# Patient Record
Sex: MALE | Race: WHITE | Employment: OTHER | URBAN - METROPOLITAN AREA
[De-identification: names, ages, dates, MRNs, and addresses within clinical notes are randomized per-mention and may not be internally consistent; named-entity substitution may affect disease eponyms.]

---

## 2019-05-14 NOTE — TELEPHONE ENCOUNTER
Patient having surgery on 05/23/19 , L4-5, L5-S1 anterior lumbar interbody fusion.  L2-3, L3-4 lateral fusion with posterior fixation with Dr Lillian Conklin [facility]    H&P-Complete  LABS-Glucose-107 ,A1C-5.5 ,BUN-28,BUN/CREA-25.5,  Calculated,+MSSA Osmolality-300

## 2019-05-14 NOTE — H&P
[facility] PRE-OP CLINIC DEWAYNEDANIAL    PRE-OP NOTE    HPI:   I have been consulted by Dr. Natanael Daniel to see Pamela Arroyo 66year old male for a preoperative evaluation and medical clearance.  He has a long history of worsening severe degenerative arthritis of his b Laterality Date   • ARTHROSCOPY OF JOINT UNLISTED Bilateral    • KNEE REPLACEMENT SURGERY Left 2016   • KNEE REPLACEMENT SURGERY Right 2000   • TONSILLECTOMY     • TOTAL KNEE REPLACEMENT Bilateral     right, left     Social History    Socioeconomic History Eyes:  Denies eye pain, visual loss, blurred vision, double vision. Ears, Nose, Throat:  Denies congestion, runny nose or sore throat. INTEGUMENTARY:  Denies rashes, itching, skin lesion,   CARDIOVASCULAR:  Denies DVT.  Denies chest pain, palpitations, jose r EXTREMITIES:  No edema, no cyanosis, no clubbing,   NEURO:  No focal deficit, speech fluent, normal gait, strength and tone, sensory intact      Lab Results   Component Value Date    WBC 3.9 (L) 05/14/2019    HGB 12.9 (L) 05/14/2019    HCT 39.0 05/14/201 apnea)  Comment: he can not tolerate the CPAP mask    (W23.336) History of total knee arthroplasty, bilateral    (S96.161) History of alcohol use    (Z88.8) Allergy status / anaphylaxis  Comment: codeine, eggplant, oxycodone, PCN, Sulfa    (Z68.36) BMI 36. protection: (hydration / NSAID and ACE/ARB avoidance)   Cognitive protection: (minimize narcotics, benzodiazepines, scopolamine)     Pain management and Physical therapy as per Orthopedic service.    Home Health as needed    Thank you for the opportunity to

## 2019-05-15 NOTE — TELEPHONE ENCOUNTER
Dr Solis Left please review.       H&P-Complete  LABS-Glucose-107 ,A1C-5.5 ,BUN-28,BUN/CREA-25.5,  Calculated,+MSSA(patient notified and instructed on antibiotic) Osmolality-300,WBC-3.9,HGB-12.9,PLT-149,  Lymphocyte Absolute-0.8  EKG-Abnormal  XRAY-Abnormal extra

## 2019-05-16 NOTE — TELEPHONE ENCOUNTER
Spoke with patient informed he is cleared for surgery ,per Dr Dickie Eisenmenger he should not take his lisinopril on the day of surgery.

## 2019-05-21 PROBLEM — M48.061 SPINAL STENOSIS OF LUMBAR REGION: Chronic | Status: ACTIVE | Noted: 2019-05-21

## 2019-05-21 PROBLEM — Z87.898 HISTORY OF ALCOHOL USE: Chronic | Status: ACTIVE | Noted: 2019-05-21

## 2019-05-21 PROBLEM — G47.33 OSA (OBSTRUCTIVE SLEEP APNEA): Status: ACTIVE | Noted: 2019-05-21

## 2019-05-21 PROBLEM — Z87.442 HISTORY OF KIDNEY STONES: Chronic | Status: ACTIVE | Noted: 2019-05-21

## 2019-05-21 PROBLEM — I10 ESSENTIAL HYPERTENSION: Chronic | Status: ACTIVE | Noted: 2019-05-21

## 2019-05-21 PROBLEM — J45.20 MILD INTERMITTENT ASTHMA WITHOUT COMPLICATION (HCC): Chronic | Status: ACTIVE | Noted: 2019-05-21

## 2019-05-21 PROBLEM — E78.2 MIXED HYPERLIPIDEMIA: Chronic | Status: ACTIVE | Noted: 2019-05-21

## 2019-05-23 PROBLEM — M41.9 KYPHOSCOLIOSIS: Chronic | Status: ACTIVE | Noted: 2019-05-23

## 2019-05-23 NOTE — PROGRESS NOTES
[facility] HOSP - [facility]    Ortho Medical Progress Note     Darin Schilling Patient Status:  Surgery Admit Nirmala Hahn    1940 MRN [id number]   Location [facility] UNIT Attending Blanca Timmons MD   Hosp Day # 0 PCP No prima meds reconciled    The patient was discussed with Dr. Ebony Zhu. We are following for medical assessment by monitoring respiratory status, hemodynamics, GI status, reviewing labs, and assisting with anticoagulation monitoring, patient specific.           Ezequiel Davidson

## 2019-05-23 NOTE — ANESTHESIA PREPROCEDURE EVALUATION
Anesthesia PreOp Note    HPI:     Cesario Velazco is a 66year old male who presents for preoperative consultation requested by: Jaimee Stinson MD    Date of Surgery: 5/23/2019    Procedure(s):  ANTERIOR SPINAL FUSION 2 LEVEL  FAR LATERAL LUMBAR INTERBODY F celecoxib 200 MG Oral Cap Take 200 mg by mouth daily. Disp:  Rfl:  5/16/2019   Lisinopril-hydroCHLOROthiazide 20-12.5 MG Oral Tab Take 1 tablet by mouth daily. Disp:  Rfl:  5/22/2019 at 1000   atorvastatin 40 MG Oral Tab Take 40 mg by mouth daily.  Disp:  R Financial resource strain: Not on file      Food insecurity:        Worry: Not on file        Inability: Not on file      Transportation needs:        Medical: Not on file        Non-medical: Not on file    Tobacco Use      Smoking status: Former Smoke Vital Signs: Body mass index is 36.81 kg/m². height is 1.651 m (5' 5\") and weight is 100.3 kg (221 lb 3 oz). His oral temperature is 98.3 °F (36.8 °C). His blood pressure is 137/78 and his pulse is 84.  His respiration is 20 and oxygen saturation is 93% I have informed Darin Schilling   of the risks of arterial lines including, but not limited to: failure, infection, bleeding, nerve damage, and vascular occlusion. The patient desires the line as proposed.     CHAVA ROPER  5/23/2019 6:40 AM

## 2019-05-23 NOTE — ANESTHESIA PROCEDURE NOTES
Arterial Line  Date/Time: 5/23/2019 7:45 AM  Performed by: Ally Hill CRNA  Authorized by: Ally Hill CRNA     Procedure Start: 5/23/2019 7:40 AM  Procedure End:  5/23/2019 7:45 AM  Site Identification: real time ultrasound guided, static

## 2019-05-23 NOTE — H&P
[facility] Patient Status:  Surgery Admit Gage Bradshaw    1940 MRN [id number]   Location [facility] Attending Luis Miguel Macdonald MD   Hosp Day # 0 PCP No primary care provider on file.      Principal Pr

## 2019-05-23 NOTE — ANESTHESIA PROCEDURE NOTES
Airway  Urgency: elective      General Information and Staff    Patient location during procedure: OR  Anesthesiologist: Dutch Lara MD  Resident/CRNA: Tino Kaur CRNA  Performed: CRNA     Indications and Patient Condition  Indications for airway

## 2019-05-23 NOTE — ANESTHESIA POSTPROCEDURE EVALUATION
Patient: Junaid Lynn    Procedure Summary     Date:  05/23/19 Room / Location:  [facility] OR  / [facility] OR    Anesthesia Start:  5138 Anesthesia Stop:  9263    Procedures:       ANTERIOR SPINAL FUSION 2 LEVEL (N/A )      FAR LATERAL LUMBAR INTERBODY FU

## 2019-05-23 NOTE — OPERATIVE REPORT
OPERATIVE REPORT    DATE OF SURGERY   5/23/19    PATIENT   Candie Haile    PREOPERATIVE DIAGNOSIS    Bilateral lumbar radiculopathy with kyphoscoliosis at L2-S1    POST OPERATIVE DIAGNOSIS   Bilateral lumbar radiculopathy with kyphoscoliosis at L2-S1    O spacer was placed. TECHNIQUE Ancef 2g was given preoperatively. Upon successful   intubation, the patient was transferred onto the regular   operating room table in a supine position. All pressure points   were well padded.  The abdomen was prepped an flank was prepped and draped in the usual   fashion. A surgical timeout was performed. The L2-3, and L3-4 disk level   was marked on the lateral radiograph from the patient's skin. An incision was made immediately anterior to the paraspinal   muscles. performed as the L3-4 level. Fluoroscopy confirmed that good straight position and   alignment of the spine. The fascia was then approximated with 0   Vicryl sutures, subcutaneous tissue, and skin was closed and   sterile dressings were applied.        The

## 2019-05-24 NOTE — RESPIRATORY THERAPY NOTE
BRAEDEN ASSESSMENT:    Pt does not have a previous diagnosis of BRAEDEN. Pt does not routinely use a CPAP device at home. This pt is not suspected to be at high risk for BRAEDEN and sleep lab packet was not provided to patient for outpatient follow-up. Cathi Kennedy

## 2019-05-24 NOTE — PHYSICAL THERAPY NOTE
PHYSICAL THERAPY EVALUATION - INPATIENT     Room Number: 207/207-A  Evaluation Date: 5/24/2019  Type of Evaluation: Initial   Physician Order: PT Eval and Treat    Presenting Problem: LBP s/p Ant/post spinal fusion w/instrumentation L2-S1  Reason for Ther to help w/pain control. The brace should be delivered today per RN (later this afternoon) and will intiiate training with pt/family. Patient and family educated regarding neutral spine and precautions (BLT).  Patient is able to verbalize 3/3 but needs cu Luis Bowen MD at [address] [place] [address] MAIN OR   • KNEE REPLACEMENT SURGERY Left 2016   • KNEE REPLACEMENT SURGERY Right 2000   • POSTERIOR LUMBAR INTERBODY FUSION - MIS TLIF 1 LEVEL N/A 5/23/2019    Performed by Luis Bowen MD at [place] [address] ACTIVITY TOLERANCE  Pulse: 115(with walking, 97 at rest)  Heart Rate Source: Monitor     BP: (!) 84/66(with walking; 115/75 at rest seated; recovery 101/55)  BP Location: Left arm  BP Method: Automatic  Patient Position: Sitting    O2 WALK        SPO2 training  Neuromuscular re-educate  Posture  Transfer training  pt/family education    Patient End of Session: Up in chair; With [facility] staff;Needs met;Call light within reach;RN aware of session/findings; All patient questions and concerns addressed; Family pres

## 2019-05-24 NOTE — PROGRESS NOTES
[facility] - [facility]    Ortho Medical Progress Note     Lissa Sierra Patient Status:  Inpatient    1940 MRN [id number]   Location [facility] 2W/SW Attending Malcom Servin MD   [facility] Day # 1 PCP No primary care provider on file. start alfusosin to in hopes to prevent urine retention        Results:     Lab Results   Component Value Date    WBC 5.9 05/24/2019    HGB 11.0 (L) 05/24/2019    HCT 33.1 (L) 05/24/2019    .0 (L) 05/24/2019    CREATSERUM 0.96 05/24/2019    BUN 22 (H

## 2019-05-25 PROBLEM — N17.9 ACUTE KIDNEY FAILURE (HCC): Status: ACTIVE | Noted: 2019-05-25

## 2019-05-25 PROBLEM — I95.81 POSTPROCEDURAL HYPOTENSION: Status: ACTIVE | Noted: 2019-05-25

## 2019-05-25 PROBLEM — T78.40XA ALLERGIC REACTION CAUSED BY A DRUG: Status: ACTIVE | Noted: 2019-05-25

## 2019-05-25 PROBLEM — N17.9 ACUTE KIDNEY FAILURE: Status: ACTIVE | Noted: 2019-05-25

## 2019-05-25 NOTE — CONSULTS
[facility]  Report of Consultation    Teofilo Jama Patient Status:  Inpatient    1940 MRN [id number]   Location [facility] 2W/SW Attending Fiorella Horton MD   [facility] Day # 2 PCP No primary care provider on ARTHROSCOPY OF JOINT UNLISTED Bilateral    • FAR LATERAL LUMBAR INTERBODY FUSION 2 LEVEL N/A 5/23/2019    Performed by Jcarlos Leal MD at [address]   • KNEE REPLACEMENT SURGERY Left 2016   • KNEE REPLACEMENT SURGERY Right 2000   • POSTERIOR LUMBAR INT (SENOKOT) tab 8.6 mg, 8.6 mg, Oral, Daily  •  bisacodyl (DULCOLAX) rectal suppository 10 mg, 10 mg, Rectal, Daily PRN  •  Cyclobenzaprine HCl (FLEXERIL) tab 5 mg, 5 mg, Oral, TID PRN  •  diphenhydrAMINE (BENADRYL) cap/tab 25 mg, 25 mg, Oral, Q4H PRN **OR** Clear without wheezes, rales, rhonchi or dullness. Normal excursions and effort. Abdomen: Soft, non-tender. Extremities: Without clubbing, cyanosis or edema. Peripheral pulses are 2+. Neurologic: Alert and oriented, normal affect.   Skin: Warm and dry

## 2019-05-25 NOTE — PLAN OF CARE
Problem: Patient/Family Goals  Goal: Patient/Family Long Term Goal  Description  Patient's Long Term Goal: Return home    Interventions:  - Monitor for home going needs  - Wean O2 as tolerated  - See additional Care Plan goals for specific interventions Achieves optimal ventilation and oxygenation  Description  INTERVENTIONS:  - Assess for changes in respiratory status  - Assess for changes in mentation and behavior  - Position to facilitate oxygenation and minimize respiratory effort  - Oxygen supplement tissue  - Implement wound care per orders  - Initiate isolation precautions as appropriate  - Initiate Pressure Ulcer prevention bundle as indicated  Outcome: Progressing

## 2019-05-25 NOTE — OCCUPATIONAL THERAPY NOTE
OCCUPATIONAL THERAPY EVALUATION - INPATIENT     Room Number: 207/207-A  Evaluation Date: 5/25/2019  Type of Evaluation: Initial  Presenting Problem: s/p spinal fusion L2-S1    Physician Order: IP Consult to Occupational Therapy  Reason for Therapy: ADL/IAD Recommendations: TBD    PLAN  OT Treatment Plan: Balance activities; Energy conservation/work simplification techniques;ADL training;IADL training;Functional transfer training; Endurance training;Patient/Family education;Patient/Family training;Equipment jayla Device(s): cane occasionally    Prior Level of Sanders: Pt reports being mod I for all ADL's and IADL's PTA.     SUBJECTIVE  \"I am going home and will go to outpatient therapy at [facility]\"    [employer] assistance(x2)  Sit to Stand:  Moderate assistance(x2)  Toilet Transfer: mod A  Shower Transfer: mod A  Chair Transfer: mod A    Bedroom Mobility: mod A    BALANCE ASSESSMENT  Static Sitting: SBA  Dynamic Sitting: CGA  Static Standing: min A  Dynamic Standi

## 2019-05-25 NOTE — OCCUPATIONAL THERAPY NOTE
OT orders received and chart reviewed. Pt off floor at x-ray this AM.  Will re-attempt later as schedule allows.

## 2019-05-25 NOTE — CONSULTS
[facility] - [facility]    Consult Note    Date:  5/25/2019  Date of Admission:  5/23/2019      Chief Complaint:   Pardeep You is a(n) 66year old male with hypotension.     HPI:   The patient is postoperative day #2 status post L to S1 anterior and po History: , 3 kids, quit tobacco 15 years ago after 1 pack/day, 3-4 alcoholic drinks per day, retired restaurant businessman  Social History    Tobacco Use      Smoking status: Former Smoker        Packs/day: 1.00        Years: 30.00        Pack year unremarkable except for the chronic discomforts and gait impairment associated with spinal stenosis. No weight loss no weight gain.     Physical Exam:   Vital Signs:  Blood pressure 98/51, pulse 89, temperature 99.3 °F (37.4 °C), temperature source Temporal hypertension–off medications at this juncture    6. History of alcohol consumption    7. Status post lumbar fusion–as per neurosurgery. Aggressive antipain, anti-thrombosis and anti-atelectasis measures.   Patient has tramadol and Dilaudid ordered for pa

## 2019-05-25 NOTE — PLAN OF CARE
Problem: SKIN/TISSUE INTEGRITY - ADULT  Goal: Incision(s), wounds(s) or drain site(s) healing without S/S of infection  Description  INTERVENTIONS:  - Assess and document risk factors for pressure ulcer development  - Assess and document skin integrity Progressing  5/24/2019 2012 by Boaz Aldana RN  Outcome: Not Progressing     Problem: CARDIOVASCULAR - ADULT  Goal: Maintains optimal cardiac output and hemodynamic stability  Description  INTERVENTIONS:  - Monitor vital signs, rhythm, and trends  - Monitor ordered and tolerated  - Evaluate effectiveness of GI medications  - Encourage mobilization and activity  - Obtain nutritional consult as needed  - Establish a toileting routine/schedule  - Consider collaborating with pharmacy to review patient's medicatio CARDIOVASCULAR - ADULT  Goal: Maintains optimal cardiac output and hemodynamic stability  Description  INTERVENTIONS:  - Monitor vital signs, rhythm, and trends  - Monitor for bleeding, hypotension and signs of decreased cardiac output  - Evaluate effectiv mobilization and activity  - Obtain nutritional consult as needed  - Establish a toileting routine/schedule  - Consider collaborating with pharmacy to review patient's medication profile  5/24/2019 2026 by Suzy Yi RN  Outcome: Not Progressing  5/24/201 this am. Pt unable to urinate, bladder scan x2 showed minimal urine. Urotraxel d/c per order. Night shift RN or PCT to recheck bladder scan & notify APN if still needs tx. GI:pt feels he is constipated, last BM 2 days ago.  Pt took prune juice at dinner,

## 2019-05-25 NOTE — PROGRESS NOTES
[facility]    Progress Note    Lora Gary Patient Status:  Inpatient    1940 MRN [id number]   Location [facility] 2W/SW Attending Daniel Do MD   [address] Day # 2 PCP No primary care provider on file.        Subjective: Essential hypertension - now hypotensive        4.) Mixed hyperlipidemia        5.) BRAEDEN (obstructive sleep apnea) - may need treatment at [facility]        6.) History of alcohol use - currently stable        7.) Postprocedural hypotension         IV fluid bolus a 5/23/2019 at 16:27     Approved by (CST): James Medina MD on 5/23/2019 at 16:28                        Courtney Guillaume DO  5/25/2019

## 2019-05-25 NOTE — PHYSICAL THERAPY NOTE
PHYSICAL THERAPY TREATMENT NOTE - INPATIENT     Room Number: 697/737-M       Presenting Problem: LBP s/p Ant/post spinal fusion w/instrumentation L2-S1    Problem List  Principal Problem:    Kyphoscoliosis  Active Problems:    Spinal stenosis of lumbar reg RESTRICTION  Weight Bearing Restriction: None                PAIN ASSESSMENT   Rating: 10  Location: low back  Management Techniques: Activity promotion; Body mechanics; Relaxation;Repositioning; Other (Comment)(medicated by RN)    BALANCE demonstrate transfers Sit to/from Stand at assistance level: modified independent with walker - rolling      Goal #2  Current Status     Goal #3 Patient is able to ambulate 200 feet with assist device: walker - rolling at assistance level: modified shikha

## 2019-05-25 NOTE — PROGRESS NOTES
[facility] Pharmacy Note:  Renal Dose Adjustment     Estefanialee ann Breen has been prescribed Cetirizine 10 mg orally daily, famotidine 20mg po BID, metoclopramide 10mg IV q6h PRN N/V and tramadol 50mg po q6h.     Estimated Creatinine Clearance: 31.5 mL/min (based on SCr

## 2019-05-26 NOTE — PLAN OF CARE
Problem: Patient/Family Goals  Goal: Patient/Family Long Term Goal  Description  Patient's Long Term Goal: Return home    Interventions:  - Monitor for home going needs  - Wean O2 as tolerated  - See additional Care Plan goals for specific interventions Achieves optimal ventilation and oxygenation  Description  INTERVENTIONS:  - Assess for changes in respiratory status  - Assess for changes in mentation and behavior  - Position to facilitate oxygenation and minimize respiratory effort  - Oxygen supplement tissue  - Implement wound care per orders  - Initiate isolation precautions as appropriate  - Initiate Pressure Ulcer prevention bundle as indicated  Outcome: Progressing     Patient stating that he feels better than when he had the allergic reaction last

## 2019-05-26 NOTE — PROGRESS NOTES
[facility] Pharmacy Note:  Renal Dose Adjustment      Elizabeth Canales had his medications renally adjusted yesterday for acute renal insufficiency. His renal function has improved today. Estimated Creatinine Clearance: 45 mL/min (based on SCr of 1.19mg/dL).

## 2019-05-26 NOTE — PLAN OF CARE
Double rn skin check was done. Pt. With same post op surgical incisions w/same tegaderm dressings sites unchanged, no skin issues. Will be available for questions/concerns.

## 2019-05-26 NOTE — PLAN OF CARE
Problem: PAIN - ADULT  Goal: Verbalizes/displays adequate comfort level or patient's stated pain goal  Description  INTERVENTIONS:  - Encourage pt to monitor pain and request assistance  - Assess pain using appropriate pain scale  - Administer analgesics any respiratory difficulty  - Respiratory Therapy support as indicated  - Manage/alleviate anxiety  - Monitor for signs/symptoms of CO2 retention  Outcome: Progressing     Problem: SKIN/TISSUE INTEGRITY - ADULT  Goal: Incision(s), wounds(s) or drain site(s

## 2019-05-26 NOTE — PLAN OF CARE
Problem: PAIN - ADULT  Goal: Verbalizes/displays adequate comfort level or patient's stated pain goal  Description  INTERVENTIONS:  - Encourage pt to monitor pain and request assistance  - Assess pain using appropriate pain scale  - Administer analgesics Assess the need for suctioning and perform as needed  - Assess and instruct to report SOB or any respiratory difficulty  - Respiratory Therapy support as indicated  - Manage/alleviate anxiety  - Monitor for signs/symptoms of CO2 retention  Outcome: Lydia up- wife at bedside-lam maintained, remote tele in place-no calls this shift

## 2019-05-26 NOTE — PROGRESS NOTES
[facility]    Progress Note    Elizabeth Canales Patient Status:  Inpatient    1940 MRN [id number]   Location [facility] 2W/SW Attending Veto Natarajan MD   [address] Day # 3 PCP No primary care provider on file.        Subjective: .0 (L) 05/26/2019    CREATSERUM 1.19 05/26/2019    BUN 30 (H) 05/26/2019     05/26/2019    K 4.7 05/26/2019     05/26/2019    CO2 26.0 05/26/2019     (H) 05/26/2019    CA 7.6 (L) 05/26/2019    ALB 3.7 05/14/2019    ALKPHO 59 05

## 2019-05-26 NOTE — PLAN OF CARE
Seen by md's & updated, pt/ot will see, report to [address] [place] [address].  Assessment unchanged w/o co or distress, skin unchanged, transferred to room 409 via bed w/ o2 3l/nc, all belongings including computer pad, cell phone, eyeglasses, duffle bag, etc. Pt. Will

## 2019-05-26 NOTE — PHYSICAL THERAPY NOTE
PHYSICAL THERAPY HIP TREATMENT NOTE - INPATIENT    Room Number: 976/370-M            Presenting Problem: LBP s/p Ant/post spinal fusion w/instrumentation L2-S1    Problem List  Principal Problem:    Kyphoscoliosis  Active Problems:    Spinal stenosis of trisha Inpatient Basic Mobility Short Form. Research supports that patients with this level of impairment may benefit from NINA. At this point, continue to anticipate d/c home with HHPT and 24.7, pending progress.     DISCHARGE RECOMMENDATIONS  PT Discharge Recomm amb with dec step length and dec americo(2 LO2)          Patient End of Session: Up in chair;Needs met;Call light within reach;RN aware of session/findings; All patient questions and concerns addressed; Family present    CURRENT GOALS   To be met by 5/30/19

## 2019-05-26 NOTE — PROGRESS NOTES
[facility]  Progress Note    Mavis Orf Patient Status:  Inpatient    1940 MRN [id number]   Location [facility] 2W/SW Attending Jose Ramon Cruz MD   [facility] Day # 3 PCP No primary care provider on file. Assessment:  1.   Pod #3 L-S Results   Component Value Date    TROP <0.045 05/25/2019        Medications:    • Alfuzosin HCl ER  10 mg Oral Daily with breakfast   • cetirizine  5 mg Oral Daily   • famoTIDine  20 mg Oral Daily   • traMADol HCl  50 mg Oral Q12H   • aspirin  324 mg Oral

## 2019-05-26 NOTE — PROGRESS NOTES
[facility] - [facility]    Progress Note      Assessment and Plan:   1. Hypotension–this was associated with a clear allergic reaction likely to the Norco with rash. May have had early anaphylaxis. Patient received a dose of Benadryl.   Also 60 mg dos 05/26/2019     05/26/2019    K 4.7 05/26/2019     05/26/2019    CO2 26.0 05/26/2019     05/26/2019    CA 7.6 05/26/2019       Hoang Davenport MD  Medical Director, Critical Care, [facility]  Medical Director, [address] [place] [address]

## 2019-05-27 NOTE — PLAN OF CARE
Problem: Patient/Family Goals  Goal: Patient/Family Long Term Goal  Description  Patient's Long Term Goal: Return home    Interventions:  - Monitor for home going needs  - Wean O2 as tolerated  - See additional Care Plan goals for specific interventions Achieves optimal ventilation and oxygenation  Description  INTERVENTIONS:  - Assess for changes in respiratory status  - Assess for changes in mentation and behavior  - Position to facilitate oxygenation and minimize respiratory effort  - Oxygen supplement tissue  - Implement wound care per orders  - Initiate isolation precautions as appropriate  - Initiate Pressure Ulcer prevention bundle as indicated  Outcome: Progressing     Problem: Patient Centered Care  Goal: Patient preferences are identified and inte

## 2019-05-27 NOTE — PHYSICAL THERAPY NOTE
PHYSICAL THERAPY TREATMENT NOTE - INPATIENT     Room Number: 189/326-R       Presenting Problem: LBP s/p Ant/post spinal fusion w/instrumentation L2-S1    Problem List  Principal Problem:    Kyphoscoliosis  Active Problems:    Spinal stenosis of lumbar reg O2 WALK        SPO2 Ambulation on Oxygen: 94  Ambulation oxygen flow (liters per minute): 3      AM-PAC '6-Clicks' INPATIENT SHORT FORM - BASIC MOBILITY  How much difficulty does the patient currently have. ..  -   Turning over in bed (inclu assist device: walker - rolling at assistance level: modified independent   Goal #3   Current Status  20 feet with CGA   Goal #4 Patient will negotiate 2 stairs/one curb w/ assistive device and supervision   Goal #4   Current Status  NT   Goal #5 Patient t

## 2019-05-27 NOTE — PROGRESS NOTES
[facility]    Progress Note    Darin Schilling Patient Status:  Inpatient    1940 MRN [id number]   Location [facility] 4W/SW/SE Attending Blanca Timmons MD   Hosp Day # 4 PCP No primary care provider on file.        Subjective: (L) 05/26/2019    .0 (L) 05/26/2019    CREATSERUM 1.19 05/26/2019    BUN 30 (H) 05/26/2019     05/26/2019    K 4.7 05/26/2019     05/26/2019    CO2 26.0 05/26/2019     (H) 05/26/2019    CA 7.6 (L) 05/26/2019    ALB 3.7 05/14/2019

## 2019-05-27 NOTE — PROGRESS NOTES
Contacted Dr. Sundar Decker service regarding the 13 beats of V-Tach at 9:54am as relayed to me by nursing and telemetry. The telemetry showed 13 beats ranging from 100 - 187 RVRTachycardia during exertion and asymptomatic response.  Patient is doing well now witho

## 2019-05-27 NOTE — PROGRESS NOTES
[place] [facility] - [facility]    Progress Note    Vijay Amezcua Patient Status:  Inpatient    1940 MRN [id number]   Location [facility] 4W/SW/SE Attending Silvia Schofiled MD   Hosp Day # 4 PCP No primary care provider on file.         Subjecti therapy nightly   D/w pt / declined     5.  Possible copd / prior h/o heavy smoking   Cough / today with mild green sputum but no fever or leukocytosis   F/u CXR and cbc in am   Neb as needed   Aspiration precautions      5.  DVT prophylaxis– SCDs temporari 05/14/2019 11:35:42 Electronically signed on 05/26/2019 at 12:34 by Maurice Coop M.D. Donnie Blend. Elex Pallas, MD  5/27/2019

## 2019-05-28 NOTE — PHYSICAL THERAPY NOTE
PHYSICAL THERAPY TREATMENT NOTE - INPATIENT     Room Number: 736/606-V       Presenting Problem: LBP s/p Ant/post spinal fusion w/instrumentation L2-S1    Problem List  Principal Problem:    Kyphoscoliosis  Active Problems:    Spinal stenosis of lumbar reg Discharge Recommendations: Sub-acute rehabilitation;24 hour care/supervision;Home with home health PT(depends on progress)     PLAN  PT Treatment Plan: Balance training;Transfer training;Gait training;Stair training    SUBJECTIVE  I want to wak    OBJECTIV EXERCISES  Lower Extremity Alternating marching  Ankle pumps  Glut sets  Heel slides  Knee extension  Quad sets     Position Sitting and Supine       Patient End of Session: Up in chair; All patient questions and concerns addressed;RN aware of session/findi

## 2019-05-28 NOTE — PROGRESS NOTES
SPINE ATTENDING NOTE     No acute distress  Alert and oriented  No overnight issues  Preoperative pain resolved      05/28/19  0549   BP: 143/82   Pulse: 87   Resp: 22   Temp:       Alert and oriented  Dressings intact, no drainage  Motor and sensory exam

## 2019-05-28 NOTE — PROGRESS NOTES
[place] [facility] - [facility]    Ortho Medical Progress Note     Mavis Orf Patient Status:  Inpatient    1940 MRN [id number]   Location [facility] 4W/SW/SE Attending Jose Ramon Cruz MD   Hosp Day # 5 PCP No primary care provider on file. dulcolax and fleets enema but the patient wants to wait until tomorrow      Results:     Lab Results   Component Value Date    WBC 5.4 05/28/2019    HGB 10.7 (L) 05/28/2019    HCT 32.6 (L) 05/28/2019    .0 05/28/2019    CREATSERUM 0.89 05/28/2019

## 2019-05-28 NOTE — PROGRESS NOTES
I was asked to evaluate the wound by the nursing staff.     /82 (BP Location: Right arm)   Pulse 87   Temp 97.9 °F (36.6 °C) (Oral)   Resp 22   Ht 5' 5\" (1.651 m)   Wt 241 lb 14.4 oz (109.7 kg)   SpO2 92%   BMI 40.25 kg/m²     Abd:  Soft, NTND, incis

## 2019-05-28 NOTE — PLAN OF CARE
Problem: Patient/Family Goals  Goal: Patient/Family Long Term Goal  Description  Patient's Long Term Goal: Return home    Interventions:  - Monitor for home going needs  - Wean O2 as tolerated  - See additional Care Plan goals for specific interventions edema, trend weights  Outcome: Progressing     Problem: RESPIRATORY - ADULT  Goal: Achieves optimal ventilation and oxygenation  Description  INTERVENTIONS:  - Assess for changes in respiratory status  - Assess for changes in mentation and behavior  - Posi strategies to promote bladder emptying  Outcome: Progressing     Problem: SKIN/TISSUE INTEGRITY - ADULT  Goal: Incision(s), wounds(s) or drain site(s) healing without S/S of infection  Description  INTERVENTIONS:  - Assess and document risk factors for pre

## 2019-05-28 NOTE — PLAN OF CARE
Problem: Patient/Family Goals  Goal: Patient/Family Long Term Goal  Description  Patient's Long Term Goal: Return home    Interventions:  - Monitor for home going needs  - Wean O2 as tolerated  - See additional Care Plan goals for specific interventions sites for bleeding and/or hematoma  - Assess quality of pulses, skin color and temperature  - Assess for signs of decreased coronary artery perfusion - ex.  Angina  - Evaluate fluid balance, assess for edema, trend weights  5/28/2019 0809 by Robb Peters, bladder scan as needed  - Follow urinary retention protocol/standard of care  - Consider collaborating with pharmacy to review patient's medication profile  - Implement strategies to promote bladder emptying  5/28/2019 0809 by Robb Peters RN  Outcome: P

## 2019-05-28 NOTE — OPERATIVE REPORT
Operative Report    Patient Name:  Darin Schilling  MR:  [id number]  :  1940  DOS:  19    Preop Dx:  Bilateral lumbar radiculopathy with kyphoscoliosis at L2-S1  Postop Dx:  Bilateral lumbar radiculopathy with kyphoscoliosis at L2-S1  Procedure implant placement at L4-L5, the operative field was irrigated with normal saline. The retractors were slowly removed and the operative field remained hemostatic.   There were no injury to the left ureter, peritoneum or the sympathetic chain during our diss

## 2019-05-28 NOTE — PROGRESS NOTES
[place] [facility] - [facility]    Progress Note    Mavis Orf Patient Status:  Inpatient    1940 MRN [id number]   Location [facility] 4W/SW/SE Attending Jose Ramon Cruz MD   Hosp Day # 5 PCP No primary care provider on file.         Subjecti h/o heavy smoking   Neb   antibiotics        6.  DVT prophylaxis– SCDs temporarily until okay with neurosurgery     7.  History of alcohol consumption                                Results:     Lab Results   Component Value Date    WBC 5.4 05/28/2019    H

## 2019-05-28 NOTE — OCCUPATIONAL THERAPY NOTE
OCCUPATIONAL THERAPY TREATMENT NOTE - INPATIENT    Room Number: 340/005-F  Presenting Problem: s/p spinal fusion L2-S1     Problem List  Principal Problem:    Kyphoscoliosis  Active Problems:    Spinal stenosis of lumbar region    Mild intermittent asthma PT/OT;Sub-acute rehabilitation  OT Device Recommendations: Long-handled shoehorn;Long-handled sponge;Reacher;Sock aid; Shower chair    PLAN  OT Treatment Plan: Patient/Family education;Patient/Family training;Functional transfer training;ADL training    SUB session/findings; All patient questions and concerns addressed    OT Goals:     Patient will complete functional transfer with CGA and AD  Comment: Goal met    Patient will complete toileting with CGA and AD  Comment: na    Patient will tolerate standing fo

## 2019-05-28 NOTE — CM/SW NOTE
SW met with the patient at bedside. The patient lives with his wife in 1 level home with 2 steps to enter. The patient responds being independent prior to admission with all ADL's, ambulation and driving. Patient has been using a cane PRN.     Patient would

## 2019-05-29 NOTE — PLAN OF CARE
Problem: PAIN - ADULT  Goal: Verbalizes/displays adequate comfort level or patient's stated pain goal  Description  INTERVENTIONS:  - Encourage pt to monitor pain and request assistance  - Assess pain using appropriate pain scale  - Administer analgesics any respiratory difficulty  - Respiratory Therapy support as indicated  - Manage/alleviate anxiety  - Monitor for signs/symptoms of CO2 retention  Outcome: Progressing     Problem: GASTROINTESTINAL - ADULT  Goal: Maintains or returns to baseline bowel func

## 2019-05-29 NOTE — PROGRESS NOTES
[facility]    Ortho Medical Progress Note     Deadra Mow Patient Status:  Inpatient    1940 MRN [id number]   Location [facility] 4W/SW/SE Attending Chikis Bruno MD   Hosp Day # 6 PCP No primary care provider on file. Results:     Lab Results   Component Value Date    WBC 5.3 05/29/2019    HGB 9.6 (L) 05/29/2019    HCT 29.2 (L) 05/29/2019    .0 (L) 05/29/2019    CREATSERUM 0.92 05/29/2019    BUN 24 (H) 05/29/2019     05/29/2019    K 4.3 05/29/2019

## 2019-05-29 NOTE — CM/SW NOTE
SW met with the patient and his wife, per family's request. SW confirmed the DC plan, they do want to go to rehab to [facility]. Facility accepted him. LUCIANA will continue to follow up.       Courtney Flowers, [address] [facility]

## 2019-05-29 NOTE — OCCUPATIONAL THERAPY NOTE
OCCUPATIONAL THERAPY TREATMENT NOTE - INPATIENT    Room Number: 588/178-D         Presenting Problem: s/p spinal fusion L2-S1     Problem List  Principal Problem:    Kyphoscoliosis  Active Problems:    Spinal stenosis of lumbar region    Mild intermittent to be instructed in back precautions, donning brace, and cues for safety with transfers. Spouse stated that she was not prepared for situation to have pt be discharged home, once she observed. Ayush Lux     DISCHARGE RECOMMENDATIONS  OT Discharge Recommendations: 2 ASSESSMENT  Static Sitting: good  Dynamic Sitting: good  Static Standing: fair+  Dynamic Standing: fair    FUNCTIONAL ADL ASSESSMENT  Grooming: CGA with set up   Bathing: NT  Toileting: mod assist  Upper Body Dressing: min assist  Lower Body Dressing: mod

## 2019-05-29 NOTE — PROGRESS NOTES
[facility] Note: Antimicrobial Weight/Renal Dose Adjustment for: aztreonam (AZACTAM)    Lillie Arevalo is a 66year old male who has been prescribed aztreonam (AZACTAM) 1000 mg every 23 hours.   CrCl is estimated creatinine clearance is 57.6 mL/min (based

## 2019-05-29 NOTE — PROGRESS NOTES
[facility]    Progress Note    Lora Gary Patient Status:  Inpatient    1940 MRN [id number]   Location [facility] 4W/SW/SE Attending Daniel Do MD   Hosp Day # 6 PCP No primary care provider on file.         Subjecti 20 mg x 1     3.  s/p Hypotension : resolved   Likely allergic reaction likely to the Standard [place] with rash.   S/p  dose of Benadryl.  Also 60 mg dose of Solu-Medrol      Avoid Norco  Observe      4.  History of BRAEDEN– will consider Pap therapy nightly   D/w pt /

## 2019-05-29 NOTE — PROGRESS NOTES
[facility]    Progress Note    Junaid Lynn Patient Status:  Inpatient    1940 MRN [id number]   Location [facility] 4W/SW/SE Attending Jessica Garber MD   Hosp Day # 6 PCP No primary care provider on file.          Enrique Intravenous Q24H   • ipratropium-albuterol  3 mL Nebulization TID   • traMADol HCl  100 mg Oral Q6H   • cetirizine  10 mg Oral Daily   • Alfuzosin HCl ER  10 mg Oral Daily with breakfast   • famoTIDine  20 mg Oral Daily   • aspirin  324 mg Oral Daily   • P

## 2019-05-29 NOTE — PHYSICAL THERAPY NOTE
PHYSICAL THERAPY TREATMENT NOTE - INPATIENT     Room Number: 181/174-X       Presenting Problem: LBP s/p Ant/post spinal fusion w/instrumentation L2-S1    Problem List  Principal Problem:    Kyphoscoliosis  Active Problems:    Spinal stenosis of lumbar reg today    OBJECTIVE  Precautions: Spine;Lumbar brace;TLSO    WEIGHT BEARING RESTRICTION  Weight Bearing Restriction: None                PAIN ASSESSMENT   Ratin  Location: back and abd  Management Techniques: Activity promotion;Repositioning; Body mechan chair;Family present; All patient questions and concerns addressed;RN aware of session/findings;Call light within reach; Needs met    CURRENT GOALS   Patient Goal Patient's self-stated goal is: to have less pain and to go home   Goal #1 Patient is able to de

## 2019-05-29 NOTE — PROGRESS NOTES
[facility] Dosing Service    Initial Pharmacokinetic Consult for Vancomycin Dosing     Lydia Su is a 66year old male who is being treated for pneumonia.   Pharmacy has been asked to dose Vancomycin by Dr. Nimisha Guerrero    He is allergic to codeine; eggplan

## 2019-05-30 NOTE — PLAN OF CARE
Problem: Patient/Family Goals  Goal: Patient/Family Long Term Goal  Description  Patient's Long Term Goal: Return home    Interventions:  - Monitor for home going needs  - Wean O2 as tolerated  - See additional Care Plan goals for specific interventions oxygenation  Description  INTERVENTIONS:  - Assess for changes in respiratory status  - Assess for changes in mentation and behavior  - Position to facilitate oxygenation and minimize respiratory effort  - Oxygen supplementation based on oxygen saturation orders  - Initiate isolation precautions as appropriate  - Initiate Pressure Ulcer prevention bundle as indicated  Outcome: Progressing     Problem: Patient Centered Care  Goal: Patient preferences are identified and integrated in the patient's plan of car

## 2019-05-30 NOTE — PROGRESS NOTES
[facility]    Progress Note      Assessment and Plan:   1. Hypotension–this was associated with a clear allergic reaction likely to the Norco with rash. May have had early anaphylaxis. Patient received a dose of Benadryl.   Also 60 mg dos with symmetric tone and strength and reflex.   Skin without gross abnormality     Results:     Lab Results   Component Value Date    WBC 5.0 05/30/2019    HGB 9.6 05/30/2019    HCT 28.8 05/30/2019    .0 05/30/2019    CREATSERUM 0.92 05/30/2019    BUN

## 2019-05-30 NOTE — OCCUPATIONAL THERAPY NOTE
OCCUPATIONAL THERAPY TREATMENT NOTE - INPATIENT    Room Number: 434/801-C         Presenting Problem: s/p spinal fusion L2-S1     Problem List  Principal Problem:    Kyphoscoliosis  Active Problems:    Spinal stenosis of lumbar region    Mild intermittent training    SUBJECTIVE  Pt seen bedside and agreeable for OT session    OBJECTIVE  Precautions: Lumbar brace;Spine    WEIGHT BEARING RESTRICTION  Weight Bearing Restriction: None                PAIN ASSESSMENT  Ratin  Location: (lower back)  Management transfer with CGA and AD  Comment: min assist x2 2/ pt appearing 'shaky', nursing aware.  Pt appearing anxious    Patient will complete toileting with CGA and AD  Comment: mod assist     Patient will tolerate standing for 5 minutes in prep for adls with SBA

## 2019-05-30 NOTE — PROGRESS NOTES
[facility]    Progress Note    Mauro Maldonado Patient Status:  Inpatient    1940 MRN [id number]   Location [facility] 4W/SW/SE Attending Katharina Helms MD   Hosp Day # 7 PCP No primary care provider on file.          Assessmen ipratropium-albuterol  3 mL Nebulization TID   • traMADol HCl  100 mg Oral Q6H   • cetirizine  10 mg Oral Daily   • Alfuzosin HCl ER  10 mg Oral Daily with breakfast   • famoTIDine  20 mg Oral Daily   • aspirin  324 mg Oral Daily   • PEG 3350  17 g Oral BI

## 2019-05-30 NOTE — PROGRESS NOTES
[facility]    Ortho Medical Progress Note     Lissa Sierra Patient Status:  Inpatient    1940 MRN [id number]   Location [facility] 4W/SW/SE Attending Malcom Servin MD   Hosp Day # 7 PCP No primary care provider on file. Incisional wound treated with wet to dry soaks    Results:     Lab Results   Component Value Date    WBC 5.0 05/30/2019    HGB 9.6 (L) 05/30/2019    HCT 28.8 (L) 05/30/2019    .0 05/30/2019    CREATSERUM 0.92 05/30/2019    BUN 25 (H) 05/30/2019    N

## 2019-05-31 NOTE — PROGRESS NOTES
HPI: Augustine Loya  is a 67 yo male who was admitted to [address] [place] [address] for a L2-S1 anterior/lateral/posterior surgery on 5/23/19 by dr. Juan Carlos Gu. Pulmonology consulted for LLL pneumonia. He was treated with antibiotics.  He had hypotension and rash, likely an allergic re tolerated in the             past.  Required IV diphenhydramine and             methylprednisolone after allergic reaction.   Oxycodone               HIVES  Peanuts                 Tightness in Throat  Penicillins             ANAPHYLAXIS  Sulfa Antibiotics incision cover with gauze and tegaderm  L flank incision foam dressing Q3 days and PRN      2.  Pneumonia  LLL  Continue levaquin 500mg po qd x 2more days in rehab  pulm consult  Nebs prn       3.  s/p Hypotension . Resolved   Likely allergic reaction likel

## 2019-05-31 NOTE — PLAN OF CARE
Problem: PAIN - ADULT  Goal: Verbalizes/displays adequate comfort level or patient's stated pain goal  Description  INTERVENTIONS:  - Encourage pt to monitor pain and request assistance  - Assess pain using appropriate pain scale  - Administer analgesics any respiratory difficulty  - Respiratory Therapy support as indicated  - Manage/alleviate anxiety  - Monitor for signs/symptoms of CO2 retention  Outcome: Progressing     Problem: GASTROINTESTINAL - ADULT  Goal: Maintains or returns to baseline bowel func the planning and delivery of care  - Encourage patient/family to participate in care and decision-making at the level they choose  - Honor patient and family perspectives and choices  Outcome: Progressing   Pt is ao x 4, pt is SBA with walker, this nurse m

## 2019-05-31 NOTE — PROGRESS NOTES
[place] [facility] - [facility]    Progress Note    Bethany Pretty Patient Status:  Inpatient    1940 MRN [id number]   Location [facility] 4W/SW/SE Attending Sixto East MD   Hosp Day # 8 PCP No primary care provider on file.         Subjecti  05/30/2019    K 4.0 05/30/2019     05/30/2019    CO2 27.0 05/30/2019     (H) 05/30/2019    CA 8.4 (L) 05/30/2019    ALB 3.7 05/14/2019    ALKPHO 59 05/14/2019    BILT 0.6 05/14/2019    TP 6.7 05/14/2019    AST 28 05/14/2019    ALT 40

## 2019-05-31 NOTE — PLAN OF CARE
Problem: Patient/Family Goals  Goal: Patient/Family Long Term Goal  Description  Patient's Long Term Goal: Return home    Interventions:  - Monitor for home going needs  - Wean O2 as tolerated  - See additional Care Plan goals for specific interventions ADULT  Goal: Achieves optimal ventilation and oxygenation  Description  INTERVENTIONS:  - Assess for changes in respiratory status  - Assess for changes in mentation and behavior  - Position to facilitate oxygenation and minimize respiratory effort  - Oxyg dressing/incision, wound bed, drain sites and surrounding tissue  - Implement wound care per orders  - Initiate isolation precautions as appropriate  - Initiate Pressure Ulcer prevention bundle as indicated  Outcome: Adequate for Discharge     Problem: Vinay Brock

## 2019-05-31 NOTE — OCCUPATIONAL THERAPY NOTE
Pt not seen at this time secondary to c/o not feeling well, nursing is aware. Will follow up with pt later in date as schedule permits.

## 2019-05-31 NOTE — PHYSICAL THERAPY NOTE
Attempted to try to see patient 2nd time, he still in bed not feeling good. Will try later to see him.

## 2019-05-31 NOTE — PHYSICAL THERAPY NOTE
Patient very anxious in bed with RN present, wants to rest at present time. Will tr to see him later.

## 2019-05-31 NOTE — CM/SW NOTE
Patient is scheduled to transfer to [facility] today, 5.31.19 at 4 pm via Barney Zamudio , quote $33. PCS form in the chart. Report [phone].       Suyapa Koroma, [facility]

## 2019-05-31 NOTE — PROGRESS NOTES
[facility]    Progress Note    Lin Seay Patient Status:  Inpatient    1940 MRN [id number]   Location [facility] 4W/SW/SE Attending Ania Valadez MD   Hosp Day # 8 PCP No primary care provider on file.        Subjective: ALKPHO 59 05/14/2019    BILT 0.6 05/14/2019    TP 6.7 05/14/2019    AST 28 05/14/2019    ALT 40 05/14/2019    PTT 29.9 05/14/2019    INR 1.08 05/14/2019    TSH 1.580 05/29/2019    TROP <0.045 05/25/2019       Xr Chest Pa + Lat Chest (cpt=71046)    Result D

## 2019-05-31 NOTE — PHYSICAL THERAPY NOTE
PHYSICAL THERAPY TREATMENT NOTE - INPATIENT     Room Number: 540/703-E       Presenting Problem: LBP s/p Ant/post spinal fusion w/instrumentation L2-S1    Problem List  Principal Problem:    Kyphoscoliosis  Active Problems:    Spinal stenosis of lumbar reg Static Sitting: Good  Dynamic Sitting: Fair           Static Standing: Fair  Dynamic Standing: Fair -    ACTIVITY TOLERANCE                         O2 WALK                  AM-PA #2  Current Status  CGA to min  A with RW   Goal #3 Patient is able to ambulate 200 feet with assist device: walker - rolling at assistance level: modified independent   Goal #3   Current Status 150 feet with CGA with RW   Goal #4 Patient will negotiate 2

## 2019-06-04 NOTE — DISCHARGE SUMMARY
[facility]  Discharge Summary    Tessie Bamberger Patient Status:  Inpatient    1940 MRN [id number]   Location [facility] 4W/SW/SE Attending No att. providers found   Hosp Day # 8 PCP No primary care provider on file.      Date of Admissio rub   or gallop   Abdomen:     Soft, non-tender, bowel sounds active all four quadrants,     no masses, no organomegaly   Genitalia:    Normal male without lesion, discharge or tenderness   Rectal:    Normal tone, normal prostate, no masses or tenderness; hydroxide 400 MG/5ML Oral Suspension  Take 30 mL by mouth daily as needed for constipation. , Historical, R-0    PEG 3350 Oral Powd Pack  Take 17 g by mouth 2 (two) times daily. , Historical, R-0    Senna 8.6 MG Oral Tab  Take 1 tablet (8.6 mg total) by mout

## 2019-06-04 NOTE — PROGRESS NOTES
HPI: Carmela Marques  is a 65 yo male who was admitted to [address] [place] [address] for a L2-S1 anterior/lateral/posterior surgery on 5/23/19 by dr. Mily Damon. Pulmonology consulted for LLL pneumonia. He was treated with antibiotics.  He had hypotension and rash, likely an allergic re Reviewed                 SUBJECTIVE/REVIEW OF SYSTEMS: Seen in room. Pain 7/10 currently. Was on scheduled tramadol, now switched to PRN. Encouraged to take in time. Had a BM today. No sob. No cp/palpitations.  No hematuria/dysuria/frequency.       PHYSICAL inpatient  Avoid Norco     4.  History of BRAEDEN  Declined pap per records     5. Possible copd / prior h/o heavy smoking   Cont nebs prn     6. HTN /controlled  Cont diltiazem, lisinopril hctz     7.  Hx etoh abuse  Cessation counseling

## 2019-06-07 NOTE — PROGRESS NOTES
HPI: Bob Knox  is a 65 yo male who was admitted to [address] [place] [address] for a L2-S1 anterior/lateral/posterior surgery on 5/23/19 by dr. Michelle Lockwood. Pulmonology consulted for LLL pneumonia. He was treated with antibiotics.  He had hypotension and rash, likely an allergic re LABS/Imaging: Reviewed     SUBJECTIVE/REVIEW OF SYSTEMS: Seen in room, wife at bedside. Reports pain well controlled. No sob. No cp/palpitations. No hematuria/dysuria/frequency.   Reports normal BMs.     PHYSICAL EXAM:  GENERAL HEALTH: NAD  HEENT: atrau h/o heavy smoking   Cont nebs prn     6. HTN /controlled  Cont diltiazem, lisinopril hctz     7.  Hx etoh abuse  Cessation counseling

## 2019-06-11 NOTE — PROGRESS NOTES
Patient presents with:  Post-Op: Pt here for post op s/p L4-5, L5-S1 anterior lumbar interbody fusion. L2-3, L3-4 lateral fusion with posterior fixation on 5/23/19.   Pt states he had pneumonia and was coughing then his incision broke open 3 days after surg

## 2019-11-23 PROBLEM — K92.2 GASTROINTESTINAL HEMORRHAGE: Status: ACTIVE | Noted: 2019-11-23

## 2019-11-23 NOTE — ANESTHESIA PREPROCEDURE EVALUATION
Anesthesia PreOp Note    HPI:     Dorita Dumont is a 78year old male who presents for preoperative consultation requested by: Yamila Moyer MD    Date of Surgery: 11/23/2019    Procedure(s):  ESOPHAGOGASTRODUODENOSCOPY (EGD)  Indication: GI Bleed MAIN OR   • TONSILLECTOMY     • TOTAL KNEE REPLACEMENT Bilateral     right, left       aspirin 81 MG Oral Chew Tab, Chew 4 tablets (324 mg total) by mouth daily. , Disp: , Rfl: 0, 11/23/2019 at 1000  Lisinopril-hydroCHLOROthiazide 20-12.5 MG Oral Tab, Take 1724  propofol (DIPRIVAN) infusion, , Intravenous, Continuous PRN, Alma Pagan MD, Stopped at 11/23/19 1723  succinylcholine chloride (ANECTINE) injection, , Intravenous, PRN, Alma Pagan MD, 100 mg at 11/23/19 1724  lactated ringers infu HYPOTENSION    Comment:Possible early anaphylaxis. Had tolerated in the             past.  Required IV diphenhydramine and             methylprednisolone after allergic reaction.   Oxycodone               HIVES  Peanuts                 Tightness in Throat on file        Forced sexual activity: Not on file    Other Topics      Concerns:        Not on file    Social History Narrative      Not on file      Available pre-op labs reviewed.   Lab Results   Component Value Date    WBC 4.4 11/23/2019    RBC 2.88 (L) benefits, risks including possible dental damage if relevant, major complications, and any alternative forms of anesthetic management. All of the patient's questions were answered to the best of my ability.  The patient desires the anesthetic management a

## 2019-11-23 NOTE — ED PROVIDER NOTES
Patient Seen in: [facility] AND [facility] Emergency Department    History   Patient presents with:  GI Bleeding (gastrointestinal)    Stated Complaint: Rectal bleeding    HPI    Patient is here with complaint of bleeding from the rectum.   He has had this dark s 1999        Years since quittin.5      Smokeless tobacco: Never Used    Alcohol use:  Yes      Alcohol/week: 14.0 standard drinks      Types: 14 Shots of liquor per week    Drug use: Not Currently        Medications :   aspirin 81 MG Oral Chew Tab Triage Vitals   BP 11/23/19 1421 (!) 89/40   Pulse 11/23/19 1421 (!) 43   Resp 11/23/19 1421 22   Temp 11/23/19 1421 97.8 °F (36.6 °C)   Temp src 11/23/19 1421 Oral   SpO2 11/23/19 1421 94 %   O2 Device 11/23/19 1612 None (Room air)       Current:/68 METABOLIC PANEL (8) - Abnormal; Notable for the following components:       Result Value    Glucose 149 (*)     BUN 60 (*)     BUN/CREA Ratio 49.2 (*)     Calculated Osmolality 312 (*)     GFR, Non- 56 (*)     All other components within no diagnosis)    Disposition:  There is no disposition on file for this visit. Follow-up:  No follow-up provider specified.     Medications Prescribed:  Current Discharge Medication List

## 2019-11-23 NOTE — ANESTHESIA PROCEDURE NOTES
Airway  Date/Time: 11/23/2019 5:25 PM  Urgency: Elective    Airway not difficult    General Information and Staff    Patient location during procedure: OR  Anesthesiologist: Nichole Casiano MD  Performed: anesthesiologist     Indications and Patient C

## 2019-11-23 NOTE — ANESTHESIA POSTPROCEDURE EVALUATION
Patient: Marlys Valdez    Procedure Summary     Date:  11/23/19 Room / Location:  [address] [place] [address] ENDOSCOPY 01 / [address] [place] [address] ENDOSCOPY    Anesthesia Start:  5325 Anesthesia Stop:  1419    Procedure:  ESOPHAGOGASTRODUODENOSCOPY (EGD) (N/A ) Diagnosis:  (Duodenal ulcers, Terryl Fanning

## 2019-11-23 NOTE — CONSULTS
Gastroenterology consultation note    Reason for consultation:  Black stools, anemia, remote history of ulcer disease      History of present illness:   The patient is a 78year old male who has a remote history of a bleeding ulcer some 25 years prior prese HIVES  Hydrocodone-Acetami*    HYPOTENSION    Comment:Possible early anaphylaxis. Had tolerated in the             past.  Required IV diphenhydramine and             methylprednisolone after allergic reaction.   Oxycodone               HIVES  Peanuts of current or ex partner: Not on file        Emotionally abused: Not on file        Physically abused: Not on file        Forced sexual activity: Not on file    Other Topics      Concerns:        Not on file    Social History Narrative      Not on file 35.1 - 46.3 fL 45.8 42.7   RDW      11.0 - 15.0 % 13.2 11.9   Platelet Count      078.1 - 450.0 10(3)uL 205.0 248.0   Prelim Neutrophil Abs      1.50 - 7.70 x10 (3) uL 2.78 3.76   Neutrophils Absolute      1.50 - 7.70 x10(3) uL 2.78 3.76   Lymphocytes Abs that we proceed with an upper endoscopy. We discussed the rationale, nature and risks. We discussed that bleeding typically ceases spontaneously or can be stopped endoscopically. If not angiographic or surgical intervention could be necessary.       Marcelino

## 2019-11-23 NOTE — OPERATIVE REPORT
[facility] Endoscopy Report      Date of Procedure:  11/23/19        Preoperative Diagnosis:  Gastrointestinal bleeding (melena)      Postoperative Diagnosis:  1. Multiple clean-based duodenal ulcerations  2.   Nonbleeding duodenal vascular one 3 mm superficial ulceration of the gastric antrum without stigmata of bleeding. Biopsies for surgical pathology were obtained. There were at least 4 ulcerations within the duodenal bulb all superficial, clean based and measuring 4-10 mm in size.   The

## 2019-11-23 NOTE — ED INITIAL ASSESSMENT (HPI)
[place] Fears presents for rectal bleeding. Pt states he has been having dark stools for last month, taking motrin \"Like candy\". Pt also reports feeling weak. Pt states 325 ASA daily.

## 2019-11-24 NOTE — PROGRESS NOTES
[facility]     Gastroenterology Progress Note    Darin Tonie Patient Status:  Inpatient    1940 MRN [id number]   Location [facility] Attending Renata Lang MD   Hosp Day # 1 PCP No primary care provider on nigel — — 87 25 98 % 5' 6\" (1.676 m) 215 lb (97.5 kg)   11/23/19 1545 104/67 — — 81 — — — —   11/23/19 1530 97/72 — — 86 — — — —   11/23/19 1515 106/64 — — 90 — — — —   11/23/19 1500 104/63 — — 96 — — — —   11/23/19 1445 106/63 — — 106 — — — —   11/23/19 1430 (

## 2019-11-24 NOTE — DISCHARGE SUMMARY
[facility]    Discharge Summary    Harl Serum Patient Status:  Inpatient    1940 MRN [id number]   Location [facility] Attending Joanie Bridges MD   Hosp Day # 1 PCP No primary care provider on file.      Date of Admis for evaluation. Hemoglobin was stable from that noted previously.   From the ED he was taken to endoscopy, which was notable for multiple clean-based duodenal ulceration and nonbleeding duodenal vascular malformation and multiple gastric antral erosions an Instructions Prescription details   dilTIAZem HCl ER Beads 120 MG Cp24  Commonly known as:  TIAZAC      Take 1 capsule (120 mg total) by mouth daily.    Quantity:  30 capsule  Refills:  11     famoTIDine 20 MG Tabs  Commonly known as:  PEPCID      Take 1 ta

## 2019-11-24 NOTE — PLAN OF CARE
Problem: Patient Centered Care  Goal: Patient preferences are identified and integrated in the patient's plan of care  Description  Interventions:  -  - Provide timely, complete, and accurate information to patient/family  - Incorporate patient and famil

## 2019-11-24 NOTE — H&P
[address] [place] [address] [address] Patient Status:  Inpatient    1940 MRN [id number]   Location [facility] Attending Jeff Carr MD   Hosp Day # 0 PCP No primary care provider on file.      Date:   Cancer Mother         stomach cancer age 46     Social History:  Social History    Tobacco Use      Smoking status: Former Smoker        Packs/day: 1.00        Years: 30.00        Pack years: 27        Quit date: 1999        Years since quittin.5 daily.  Thiamine HCl 100 MG Oral Tab, Take 1 tablet (100 mg total) by mouth daily. traMADol HCl 50 MG Oral Tab, Take 2 tablets (100 mg total) by mouth every 6 (six) hours. folic acid 861 MCG Oral Tab, Take 800 mcg by mouth daily.         Review of Systems 1.580 05/29/2019    TROP <0.045 05/25/2019                 Assessment/Plan:     Gastrointestinal hemorrhage  Likely upper GI bleeding  BUN elevated at 60 likely reflective of upper GI bleeding.   EGD with multiple clean-based duodenal ulcerations and Fito Kahn

## 2019-11-25 NOTE — PROGRESS NOTES
DUSTIN s/w patient, HIPAA verified. Med review completed. He states that he has someone else on the line, that he is getting his wife ready for surgery and was frustrated and to call him another time. DUSTIN will try again later.

## 2019-11-25 NOTE — TELEPHONE ENCOUNTER
Pt discharged from [facility] on 11/24/19, hospitalist MD recommending apt w new PCP this week to establish care and for hospital follow up .  Pt declined appt this week states he is not available until Friday 12/6, Pt scheduled that day w Dr Vamshi Dos Santos

## 2019-11-25 NOTE — TELEPHONE ENCOUNTER
Pt requesting a call back to discuss medication pertaining to when he can take other medications after taking Pantoprazole Sodium.  Please call [phone]        Current Outpatient Medications:   •  Pantoprazole Sodium 40 MG Oral Tab EC, Take 1 tablet (40

## 2019-11-25 NOTE — TELEPHONE ENCOUNTER
LOV N/A    LAST LAB none    LAST RX  11/24/19 60 tablet    Next OV No future appointments.       PROTOCOL    Name from pharmacy: PANTOPRAZOLE 40MG TABLETS         Will file in chart as: PANTOPRAZOLE SODIUM 40 MG Oral Tab EC         Sig: TAKE 1 TABLET BY HEMALATHA

## 2019-11-25 NOTE — TELEPHONE ENCOUNTER
Pt calling asking if he can have coffee or eat after taking PPI, I informed him he should wait about 30 mins, if he has questions regarding other medications he is taking with PPI he should call pharmacist for further information that.  Patient verbalized m

## 2019-11-26 NOTE — PROGRESS NOTES
Initial Post Discharge Follow Up   Discharge Date: 11/24/19  Contact Date: 11/25/2019    Consent Verification:  Assessment Completed With: Patient  HIPAA Verified?   Yes    Discharge Dx:   [place]      General:   • How have you been since your discharge f your medications with you to make sure we are not missing anything? yes  • Are there any reasons that keep you from taking your medication as prescribed? No  Are you having any concerns with constipation?  No    Referrals/orders at D/C:  Home Health/Services outpatient medications with patient,  and orders reviewed and discussed. Any changes or updates to medications and or orders sent to PCP.

## 2019-11-27 NOTE — TELEPHONE ENCOUNTER
CBC ordered. It would be preferable to hold the Celebrex for at least the next 2 weeks. Although Celebrex is less likely to cause ulcers of the stomach it still may do so especially with concurrent aspirin therapy.

## 2019-11-27 NOTE — TELEPHONE ENCOUNTER
Pt notified with understanding. Remy Jordan to have completed per GS.  Future order active in chart. GS please advise, pt states he takes Celebrex daily which the hospitalist was not aware of. Is it ok for him to continue this medication?

## 2019-11-27 NOTE — TELEPHONE ENCOUNTER
Pt called hospitalist office stating at hospital [place] Dr David Webb told him to stop lisinopril and resume if SBP is Greater than 150. Pt states BP this AM was 128/74 and 144/97 later this afternoon. Pt states to be asymptomatic at this time.   But wants to know

## 2019-11-27 NOTE — TELEPHONE ENCOUNTER
Notes recorded by Tra Shi MD on 11/27/2019 at 7:02 AM CST  GI RN staff: Please inform the patient that his blood count has increased. Sil Vela should repeat a blood count in 1 month.  I can order the lab or the patient can have the testing done th

## 2019-12-03 NOTE — TELEPHONE ENCOUNTER
Per previous coversation w Patient he was instructed at discharge to resume lisinopril if SBP >150. VM from pt over the weekend stating BP > 150 and asking if he should resume lisinopril. Call back made today for follow up.  Pt states he restarted lisin

## 2019-12-06 NOTE — PROGRESS NOTES
Blood pressure 123/75, pulse 86, temperature 98.2 °F (36.8 °C), temperature source Oral, height 5' 6\" (1.676 m), weight 222 lb (100.7 kg). Patient presents today following up for recent hospitalization for gastrointestinal hemorrhage.   He reports that

## 2019-12-27 NOTE — TELEPHONE ENCOUNTER
Pt asking if he needs to complete CBC ordered 11/26/19 by Dr. Glenda Bryant as he have a CBC done 11/24/19, I informed him that Dr. Glenda Bryant did want another CBC done a month later from 11/26/19. Patient states he will have lab done today or Monday. uL 3.76 3.51   Lymphocytes Absolute      1.00 - 4.00 x10(3) uL 0.70 (L) 0.53 (L)   Monocytes Absolute      0.10 - 1.00 x10(3) uL 0.78 0.65   Eosinophils Absolute      0.00 - 0.70 x10(3) uL 0.24 0.20

## 2019-12-27 NOTE — TELEPHONE ENCOUNTER
Patient has order for labs in system, patient just had same orders completed last month by Vipul Vora. Patient asking if CBS with differential need to be completed at this time and if fasting is required. Please call at:[phone],thanks.

## 2020-01-08 NOTE — TELEPHONE ENCOUNTER
Patient would like to know if he can go back to taking rx:celbrex and rx:folic acid, please call at:  [phone][facility]

## 2020-01-08 NOTE — TELEPHONE ENCOUNTER
Please contact the patient. Resumption of Celebrex will likely be tolerated with continued concurrent proton pump inhibitor therapy, however, I cannot exclude a recurrent ulcer/bleeding.

## 2020-01-08 NOTE — TELEPHONE ENCOUNTER
Dr. Ana Galloway, pt inquiring if he can restart his Celbrex and folic acid, states has been off of it for about 1 month, his arthritis is starting to flare up and would like to restart. Noted per 11/27/19 results TE you advised pt to hold for 2wks.  Pt wou

## 2020-01-08 NOTE — TELEPHONE ENCOUNTER
Spoke to pt and relayed Dr. Alma Rosa Saleem message as shown below. Pt verbalized understanding and had no further questions at this time.

## 2020-01-27 NOTE — TELEPHONE ENCOUNTER
----- Message from Jennifer Nichols MD sent at 1/24/2020  5:08 PM CST -----  RN staff: Please contact the patient. He had requested the name of an alternative primary care physician.   I would recommend Dr. Davis Mullins.  Please provide contact infor

## 2020-02-07 NOTE — PROGRESS NOTES
HPI:   Cough   This is a new problem. The current episode started yesterday. The problem has been unchanged. The cough is non-productive.  Pertinent negatives include no chest pain, chills, ear congestion, ear pain, fever, headaches, nasal congestion, postn kid    History:   Annual Physical due on 08/22/1943  Pneumococcal PPSV23/PCV13 65+ Years / Low and Medium Risk(1 of 2 - PCV13) due on 08/22/2005  Influenza Vaccine(1) due on 09/01/2019  Annual Depression Screen due on 11/23/2020  Fall Risk Screening due on Relationships      Social connections:        Talks on phone: Not on file        Gets together: Not on file        Attends Sikhism service: Not on file        Active member of club or organization: Not on file        Attends meetings of clubs or [place] normal.   Mouth/Throat: Oropharynx is clear and moist.   Eyes: Conjunctivae are normal. Right eye exhibits no discharge. Left eye exhibits no discharge. Cardiovascular: Normal rate, regular rhythm, S1 normal, S2 normal and normal heart sounds.     No murm

## 2020-02-08 PROBLEM — R05.9 COUGH: Status: ACTIVE | Noted: 2020-02-08

## 2020-03-10 NOTE — PROGRESS NOTES
[facility] Follow-up Visit    [place]orro ulcerations, nonbleeding duodenal vascular malformation, multiple gastric antral erosions and superficial ulceration without stigmata of bleeding, 2 cm hiatal hernia, recommend PPI twice daily    2-3 years ago EGD performed at [facility], results not available i Comment: DAILY    Drug use: Not Currently       Medications (Active prior to today's visit):  Current Outpatient Medications   Medication Sig Dispense Refill   • Pantoprazole Sodium 20 MG Oral Tab EC Take 1 tablet (20 mg total) by mouth every morning befor breath  CARDIOVASCULAR:  negative for  chest pain  GASTROINTESTINAL:  see HPI  GENITOURINARY:  negative for dysuria and hematuria   SKIN:  negative for  rash  ALLERGIC/IMMUNOLOGIC:  negative for hay fever allergies  ENDOCRINE:  negative for cold intoleranc esophageal findings or strictures that would correlate with the patient's current complaints. Given that the EGD was <6 months ago, I doubt significant upper GI pathology has developed related to this.   Certainly symptoms could be related to the type of t

## 2020-04-05 NOTE — ED NOTES
Pt and spouse provided and explained d/c instructions, at-home care, and follow-up. Pt in nad at this time. No iv access. Vss. Dominguez. Ambulatory. A&ox3. Belongings with pt. Additional supplies provided for ent care while at home.  All questions and concerns a

## 2020-04-05 NOTE — ED PROVIDER NOTES
Patient Seen in: [facility] AND [facility] Emergency Department      History   Patient presents with:  Nose Bleed    Stated Complaint: Nosebleed; seen here last night. No improvement. HPI    Pt is 79 yo M who p/w persistent epistaxis in right nare.  Pt was see in HPI. Constitutional and vital signs reviewed. All other systems reviewed and negative except as noted above.     Physical Exam     ED Triage Vitals [04/05/20 1106]   /75   Pulse 77   Resp 16   Temp 97.9 °F (36.6 °C)   Temp src Oral   SpO2 96

## 2020-04-05 NOTE — ED INITIAL ASSESSMENT (HPI)
Pt came back to ED for continued nose bleed through packing that was placed here last night. No blood thinners. Did blow his nose this morning. RR even and nonlabored, speaking in full sentences, ambulatory with steady gait.

## 2020-04-05 NOTE — ED NOTES
Pt states he took an aleve today (for the first time) for his chronic back pain. Furthermore, pt states that he sneezed x2 which caused the epistaxis immediately thereafter.

## 2020-04-05 NOTE — ED PROVIDER NOTES
Patient Seen in: [facility] AND [facility] Emergency Department    History   Patient presents with:  Nose Bleed      HPI    Patient presents to the ED complaining of bleeding from his right nostril a started 3 hours ago.   He states bleeding started after he was Years since quittin.9      Smokeless tobacco: Never Used    Substance and Sexual Activity      Alcohol use:  Yes        Alcohol/week: 14.0 standard drinks        Types: 14 Shots of liquor per week        Comment: DAILY      Drug use: Not Currently Results Available and Reviewed while in ED:     ED Medications Administered:   Medications   tranexamic acid (CYKLOKAPRON) nasal solution 500 mg (500 mg Topical Given 4/4/20 2227)         MDM      04/04/20 2147 04/04/20 2200 04/04/20  2215 04/04/20 2230 needed      Medications Prescribed:  Discharge Medication List as of 4/4/2020 10:39 PM

## 2020-04-06 NOTE — TELEPHONE ENCOUNTER
Per pt was seen at [facility] ED on 4/4, had nose packed, states he needs appointment with . Please advise thank you.

## 2020-04-06 NOTE — ED INITIAL ASSESSMENT (HPI)
78year old male here with nasal packing from bloody nose, pt reports would like nasal packing removed

## 2020-04-06 NOTE — ED PROVIDER NOTES
Patient Seen in: [facility] AND [facility] Emergency Department      History   No chief complaint on file.     Stated Complaint: Remove gauze from nose    HPI 60-year-old male with no significant past medical history here with complaints of  Requesting nasal pack Alcohol/week: 14.0 standard drinks      Types: 14 Shots of liquor per week      Comment: DAILY    Drug use: Not Currently             Review of Systems   Constitutional: Negative for appetite change, fatigue and fever.    HENT: Negative for congestion, ear Pulmonary effort is normal. No respiratory distress. Breath sounds: Normal breath sounds. No stridor. No wheezing or rales. Abdominal:      General: There is no distension. Palpations: Abdomen is soft. Tenderness: There is no tenderness.  Kristen Read file. to contribute to the complexity of his ED evaluation.     - pt  comfortable with d/c at this time, will d/c pt home now, pt to f/u with Dr. Kathi Miles in 2 days or return to ED sooner if symptoms worsen including fevers, bleeding, lightheadedness, pt expre

## 2020-04-09 NOTE — PROGRESS NOTES
Pamela Arroyo is a 78year old male. Patient presents with:  Nose Bleed: Right nostril nose bleed 4/4, went to ED and packing placed. Went back to ED 4/6 and they removed packing. No bleeding since.     HPI:   He had a severe episode of bleeding from the ri taking: Reported on 4/9/2020 ) 80 g 1      Past Medical History:   Diagnosis Date   • Anesthesia complication     anxiety from  OR experience as a child   • Asthma     mild   • Back problem    • Calculus of kidney    • High blood pressure    • High cholest Normal Inspection - Right: Normal, Left: Normal. Canal - Left: Normal. TM - Right: Normal, Left: Normal.     ASSESSMENT AND PLAN:   1. Epistaxis  He had some episodes of epistaxis which seem to have resolved at this point.   I discussed moisture measures wi

## 2021-04-08 NOTE — ED PROVIDER NOTES
Patient Seen in: [facility] AND [facility] Emergency Department    History   Patient presents with:  Chest Pain Angina      HPI    70-year-old male presents the ER with complaint of left-sided chest wall pain.   Patient states pain has been there on and off for the Quit date: 1999        Years since quittin.9      Smokeless tobacco: Never Used    Vaping Use      Vaping Use: Never used    Substance and Sexual Activity      Alcohol use:  Yes        Alcohol/week: 14.0 standard drinks        Types: 14 Shots of l tenderness, lungs clear to auscultation bilaterally. Abdominal:      General: Bowel sounds are normal.      Palpations: Abdomen is soft. Musculoskeletal:         General: Normal range of motion.       Cervical back: Normal range of motion and neck supple reports. Complicating Factors: The patient already has does not have any pertinent problems on file. to contribute to the complexity of this ED evaluation. ED Course: 80-year-old male presents the ER with complaint of chest wall pain.   Patient with 3

## 2021-04-08 NOTE — ED INITIAL ASSESSMENT (HPI)
Left sided chest pain intermittently x1 week, worse today. Reports \"normal\" shortness of breath. Denies dizziness, N/V. Reports falling \"a few weeks ago. \"

## 2023-07-11 NOTE — ED INITIAL ASSESSMENT (HPI)
Pt to ED with c/o atraumatic left foot pain for about 10 days. Pt states pain worse in the morning. Pt ambulating by self with steady gait. Denies fever or redness. Denies hx of diabetes.

## 2023-09-29 NOTE — ED INITIAL ASSESSMENT (HPI)
Patient ambulatory to triage, c/o shooting pains to his left mid flank area with radiation to the back. Hx of back issues.

## 2023-09-29 NOTE — ED QUICK NOTES
Two nights ago experienced sharp pain to left lateral chest, denies falls or trauma.       Today pain to left posterior chest \"spasms, when moving\", denies SOB, no current respiratory distress, denies history blood clots

## 2024-01-17 NOTE — DISCHARGE INSTRUCTIONS
TYPE OF MYELOGRAM: LUMBAR    TRAVEL/ACTIVITY    1.  Go home and rest quietly until the next day.  2.  Recline positioning with head elevated at a 30-40 degree angle       (using 2-3 pillows) on sofa/bed/recliner.  3.  Sleep in recline position.  4.  DO NOT LIE FLAT or allow your head to be lower than the rest of your       Body for the first 24 hours.  5.  Avoid frequent repositioning.  6. You may get up to use the restroom.  7.  DO NOT DRIVE TODAY.  Resume light activity tomorrow.  You may return to work tomorrow.    DIET    8.  No dietary restrictions.  Drink extra liquids today; one cup every hour for eight hours.  9.  Avoid consumption of caffeine to one serving during the first 24 hours.  Avoid alcoholic beverages.    MEDICATIONS    10.  You may resume any prescription medications taken before the myelogram.         However, DO NOT take aspirin, motrin, ibuprofen, or any medications containing         NSAIDs for 24 hours.  11.  Contact attending physician for resumption of anticoagulants, coumadin, and/or         aspirin therapy.  12.  Continue to hold medications that you were instructed to hold over the last 48 hours         for an additional 24 hours.    SIDE EFFECTS    13. Most common - Headaches    Headaches that occur within the first few hours can be treated with tylenol and positioning. Headaches that occur over the next 24 hours (second day) and last for several days are referred to as \"Spinal Headaches\".    *Symptoms are usually headache pain, eye discomfort, and nausea. Position change from a lying to a sitting or standing position usually increases the discomfort.                                                                                                                          Page 1 of 2    POST MYELOGRAM DISCHARGE INSTRUCTIONS(cont'd)        First line of treatment is conservative care: Return to bed rest; lie flat, start Tylenol every 4 hours. Drink large volumes of fluid containing  caffeine over the next 24 hours. If symptoms are reduced or manageable, continue this plan of care.    If headache is persistent after 24 to 48 hours, notify the ordering physician and/or Pain Center. Spinal headaches are generally referred to the Pain Center at the [facility] [phone], Monday - Friday, 8am - 4pm.     If your headache is extreme and persistent and the Pain Center is unavailable, go to your local emergency department, explain the procedure you had and the symptoms you are experiencing. Treatment will be provided accordingly.     Pain at the injection site is quite common and usually resolves itself over 24 - 48 hours. Continue treatment with Tylenol. Call your physician for any further questions, problems, or test results. They may contact the Radiology Department for further information by asking to speak with a Radiology Nurse at [phone].                                                                                                                           Page 2 of 2            THANK YOU, WE WISH YOU WELL.

## 2024-01-17 NOTE — IMAGING NOTE
SBAR  S; SCHEDULED L.M. PROCEDURE    B: HX OF SPINAL STENOSIS- KYPHOSCOLIOSIS , HTN ON MEDS-ASTHMA, CKD.   CHRONIC PAIN HAS A INTERICTAL PAIN DEVICE. PT AWARE, V/U AND AGREES MAY HAVE TO DC IT FOR CT.     A;  ALG & CURRENT MEDS REVIEWED-NO CONTRADICTIONS FOR PROCEDURE. VSS. ROMY SKIN PINK D/I. PT HAS  - WIFE.   NPO 6 HR.     1140 PT TO XRAY -MARK XRAY TECH NOTIFIED PT STATUS.

## 2024-01-17 NOTE — IMAGING NOTE
1400 RECEIVED PATIENT PRIOR TO DISCHARGE. VERY ACTIVE WITH ROM,     1402  /76 HR 73 PO2 SAT 93%. REVIEW DISCHARGE. DISCHARGE TO HOME, WIFE DRIVING.    BP UPON ARRIVAL TO HOLDING 145/90 HR 76 PO2 SAT 94%

## 2024-01-22 NOTE — ED PROVIDER NOTES
Patient Seen in: [facility] Emergency Department    History     Chief Complaint   Patient presents with    Foot Pain     Stated Complaint: left foot pain and swelling     HPI    83-year-old male with past medical history of hypertension, dyslipidemia presenting with wife for evaluation with 2 days of left plantar pain and swelling associated with itchiness.  No trauma.  No recent travel or surgeries, no recent immobilization.  Taking acetaminophen without relief.    Past Medical History:   Diagnosis Date    Anesthesia complication     anxiety from  OR experience as a child    Back pain     Back problem     Calculus of kidney     Essential hypertension     High blood pressure     High cholesterol     History of blood transfusion     Sleep apnea     Visual impairment     readers       Past Surgical History:   Procedure Laterality Date    ARTHROSCOPY OF JOINT UNLISTED Bilateral     COLONOSCOPY      EGD  2019        TONSILLECTOMY      TOTAL KNEE REPLACEMENT Bilateral     right, left            Family History   Problem Relation Age of Onset    Cancer Mother         stomach cancer age 52       Social History     Socioeconomic History    Marital status:    Tobacco Use    Smoking status: Former     Packs/day: 1.00     Years: 30.00     Additional pack years: 0.00     Total pack years: 30.00     Types: Cigarettes     Quit date: 1999     Years since quittin.7    Smokeless tobacco: Never   Vaping Use    Vaping Use: Never used   Substance and Sexual Activity    Alcohol use: Not Currently     Alcohol/week: 2.0 - 3.0 standard drinks of alcohol     Types: 2 - 3 Standard drinks or equivalent per week     Comment: DAILY    Drug use: Not Currently       Review of Systems :  Constitutional: As per HPI  Musculoskeletal: (+) foot pain.    Positive for stated complaint: left foot pain and swelling  Other systems are as noted in HPI.  Constitutional and vital signs reviewed.      All other systems  reviewed and negative except as noted above.    PSFH elements reviewed from today and agreed except as otherwise stated in HPI.    Physical Exam     ED Triage Vitals   BP 01/22/24 1236 120/75   Pulse 01/22/24 1236 84   Resp 01/22/24 1236 18   Temp 01/22/24 1236 98 °F (36.7 °C)   Temp src --    SpO2 01/22/24 1236 98 %   O2 Device 01/22/24 1625 None (Room air)       Current:/75   Pulse 84   Temp 98 °F (36.7 °C)   Resp 18   Wt 106.6 kg   SpO2 98%   BMI 39.11 kg/m²         Physical Exam   Constitutional: No distress.   HEENT: MMM.  Head: Normocephalic.   Eyes: No injection.   Neck: Neck supple.    Cardiovascular: LLE with 2+ DP/PT pulses.   Pulmonary/Chest: Effort normal.   Musculoskeletal: No gross deformity. Left plantar foot tenderness with posterolateral/dorsal swelling and erythema without associated crepitance.  Neurological: Alert. LLE with 5/5 strength proximally and distally.  Skin: Skin is warm.   Psychiatric: Cooperative.  Nursing note and vitals reviewed.        ED Course   Labs Reviewed - No data to display  US VENOUS DOPPLER LEG LEFT - DIAG IMG (CPT=93971)    Result Date: 1/22/2024  PROCEDURE: US VENOUS DOPPLER LEG LEFT-DIAG IMG (CPT=93971)  COMPARISON: None.  INDICATIONS: Left lower extremity swelling.  TECHNIQUE: Color duplex Doppler venous ultrasound of the left lower extremity was performed in the usual manner.  FINDINGS: The femoral and popliteal veins appear normal.  Normal flow was demonstrated with color and pulsed Doppler.  Visualized portions of saphenous, and proximal calf veins appear normal. THROMBI: None visible. COMPRESSIBILITY: Normal. OTHER: Negative.         CONCLUSION:  1. No left lower extremity DVT.    Dictated by (CST): Brigido De Leon MD on 1/22/2024 at 3:40 PM     Finalized by (CST): Brigido De Leon MD on 1/22/2024 at 3:41 PM          XR FOOT, COMPLETE (MIN 3 VIEWS), LEFT (CPT=73630)    Result Date: 1/22/2024  PROCEDURE: XR FOOT, COMPLETE (MIN 3 VIEWS), LEFT (CPT=73630)   COMPARISON: [facility], XR FOOT, COMPLETE (MIN 3 VIEWS), LEFT (CPT=73630), 7/11/2023, 2:39 PM.  INDICATIONS: Left lateral foot pain, swelling, and itching x 3 days.  TECHNIQUE: 3 views were obtained.   FINDINGS:  BONES: No acute appearing fracture or dislocation.  Moderate degenerative narrowing of the left 1st metatarsophalangeal joint.  Mild narrowing of the PIP and DIP joints of the 2nd through 5th digits.  Moderate size calcaneal plantar enthesophyte. SOFT TISSUES: Negative. No visible soft tissue swelling. EFFUSION: None visible. OTHER: Negative.         CONCLUSION:  1. No acute appearing fracture or dislocation.  Degenerative changes as noted above grossly stable.    Dictated by (CST): Enrico Lindo MD on 1/22/2024 at 2:16 PM     Finalized by (CST): Enrico Lindo MD on 1/22/2024 at 2:18 PM                 MDM   DIFFERENTIAL DIAGNOSIS: After history and physical exam differential diagnosis includes but is not limited to DVT, fracture, cellulitis.    Pulse ox: 98%:Normal on RA, as interpreted by myself    Medical Decision Making  Evaluaton for atraumatic left foot pain/swelling without crepitance or neurovascular compromise - imaging nonacute, will discharge with symptomatic care, empiric antibiotics given foot swelling/erythema and ongoing outpatient followup including podiatry referral.    Problems Addressed:  Left foot pain: acute illness or injury    Amount and/or Complexity of Data Reviewed  Radiology: ordered and independent interpretation performed. Decision-making details documented in ED Course.     Details: XR without obvious dislocation as independently interpreted by myself    Risk  Prescription drug management.      I was wearing at minimum a facemask and eye protection throughout this encounter with handwashing performed prior and after patient evaluation without personal hand/facial/oropharyngeal contact and gloves worn throughout encounter. See note and/or contact this provider  for further PPE details.      Disposition and Plan     Clinical Impression:  1. Left foot pain        Disposition:  Discharge    Follow-up:  [facility], [place]  [address] [address]  [phone]    Call  For podiatry followup and re-evaluation.      Medications Prescribed:  Discharge Medication List as of 1/22/2024  4:38 PM        START taking these medications    Details   doxycycline 100 MG Oral Cap Take 1 capsule (100 mg total) by mouth 2 (two) times daily for 7 days., Normal, Disp-14 capsule, R-0      Etodolac 500 MG Oral Tab Take 1 tablet (500 mg total) by mouth 2 (two) times daily for 5 days., Normal, Disp-10 tablet, R-0

## 2024-02-29 ENCOUNTER — TELEPHONE (OUTPATIENT)
Dept: FAMILY MEDICINE CLINIC | Facility: CLINIC | Age: 84
End: 2024-02-29

## 2024-02-29 DIAGNOSIS — R73.01 ELEVATED FASTING BLOOD SUGAR: ICD-10-CM

## 2024-02-29 DIAGNOSIS — I25.10 CORONARY ARTERY DISEASE INVOLVING NATIVE HEART WITHOUT ANGINA PECTORIS, UNSPECIFIED VESSEL OR LESION TYPE: ICD-10-CM

## 2024-02-29 DIAGNOSIS — Z01.818 PREOPERATIVE EXAMINATION, UNSPECIFIED: Primary | ICD-10-CM

## 2024-02-29 DIAGNOSIS — R94.31 ABNORMAL EKG: ICD-10-CM

## 2024-02-29 DIAGNOSIS — Z01.812 PRE-OPERATIVE LABORATORY EXAMINATION: ICD-10-CM

## 2024-03-01 ENCOUNTER — HOSPITAL ENCOUNTER (OUTPATIENT)
Dept: GENERAL RADIOLOGY | Facility: HOSPITAL | Age: 84
Discharge: HOME | End: 2024-03-01
Attending: FAMILY MEDICINE
Payer: MEDICARE

## 2024-03-01 ENCOUNTER — OFFICE VISIT (OUTPATIENT)
Dept: FAMILY MEDICINE CLINIC | Facility: CLINIC | Age: 84
End: 2024-03-01
Payer: MEDICARE

## 2024-03-01 ENCOUNTER — LAB ENCOUNTER (OUTPATIENT)
Dept: LAB | Facility: HOSPITAL | Age: 84
End: 2024-03-01
Attending: FAMILY MEDICINE
Payer: MEDICARE

## 2024-03-01 VITALS
DIASTOLIC BLOOD PRESSURE: 62 MMHG | RESPIRATION RATE: 20 BRPM | OXYGEN SATURATION: 95 % | SYSTOLIC BLOOD PRESSURE: 142 MMHG | HEART RATE: 101 BPM | BODY MASS INDEX: 39.15 KG/M2 | HEIGHT: 65 IN | WEIGHT: 235 LBS | TEMPERATURE: 97 F

## 2024-03-01 DIAGNOSIS — I10 ESSENTIAL HYPERTENSION: Chronic | ICD-10-CM

## 2024-03-01 DIAGNOSIS — G47.33 OSA (OBSTRUCTIVE SLEEP APNEA): ICD-10-CM

## 2024-03-01 DIAGNOSIS — Z01.818 PREOPERATIVE EXAMINATION, UNSPECIFIED: ICD-10-CM

## 2024-03-01 DIAGNOSIS — R73.01 ELEVATED FASTING BLOOD SUGAR: ICD-10-CM

## 2024-03-01 DIAGNOSIS — I95.81 POSTPROCEDURAL HYPOTENSION: ICD-10-CM

## 2024-03-01 DIAGNOSIS — Z01.812 PRE-OPERATIVE LABORATORY EXAMINATION: ICD-10-CM

## 2024-03-01 DIAGNOSIS — I25.10 ATHEROSCLEROSIS OF NATIVE CORONARY ARTERY OF NATIVE HEART WITHOUT ANGINA PECTORIS: ICD-10-CM

## 2024-03-01 DIAGNOSIS — Z87.442 HISTORY OF KIDNEY STONES: Chronic | ICD-10-CM

## 2024-03-01 DIAGNOSIS — Z87.898 HISTORY OF ALCOHOL USE: Chronic | ICD-10-CM

## 2024-03-01 DIAGNOSIS — M48.062 SPINAL STENOSIS OF LUMBAR REGION WITH NEUROGENIC CLAUDICATION: Primary | Chronic | ICD-10-CM

## 2024-03-01 DIAGNOSIS — M41.9 KYPHOSCOLIOSIS: Chronic | ICD-10-CM

## 2024-03-01 DIAGNOSIS — E78.2 MIXED HYPERLIPIDEMIA: Chronic | ICD-10-CM

## 2024-03-01 DIAGNOSIS — Z01.818 PREOPERATIVE EXAMINATION, UNSPECIFIED: Primary | ICD-10-CM

## 2024-03-01 DIAGNOSIS — J45.20 MILD INTERMITTENT ASTHMA WITHOUT COMPLICATION (HCC): Chronic | ICD-10-CM

## 2024-03-01 DIAGNOSIS — M15.9 PRIMARY OSTEOARTHRITIS INVOLVING MULTIPLE JOINTS: ICD-10-CM

## 2024-03-01 LAB
ALBUMIN SERPL-MCNC: 4.1 G/DL (ref 3.2–4.8)
ALBUMIN/GLOB SERPL: 1.6 {RATIO} (ref 1–2)
ALP LIVER SERPL-CCNC: 65 U/L
ALT SERPL-CCNC: 30 U/L
ANION GAP SERPL CALC-SCNC: 5 MMOL/L (ref 0–18)
ANTIBODY SCREEN: NEGATIVE
APTT PPP: 28.8 SECONDS (ref 23.3–35.6)
AST SERPL-CCNC: 27 U/L (ref ?–34)
ATRIAL RATE: 79 BPM
BASOPHILS # BLD AUTO: 0.01 X10(3) UL (ref 0–0.2)
BASOPHILS NFR BLD AUTO: 0.2 %
BILIRUB SERPL-MCNC: 0.6 MG/DL (ref 0.2–1.1)
BILIRUB UR QL: NEGATIVE
BUN BLD-MCNC: 32 MG/DL (ref 9–23)
BUN/CREAT SERPL: 25.8 (ref 10–20)
CALCIUM BLD-MCNC: 9.7 MG/DL (ref 8.7–10.4)
CHLORIDE SERPL-SCNC: 111 MMOL/L (ref 98–112)
CO2 SERPL-SCNC: 26 MMOL/L (ref 21–32)
COLOR UR: YELLOW
CREAT BLD-MCNC: 1.24 MG/DL
DEPRECATED RDW RBC AUTO: 42 FL (ref 35.1–46.3)
EGFRCR SERPLBLD CKD-EPI 2021: 58 ML/MIN/1.73M2 (ref 60–?)
EOSINOPHIL # BLD AUTO: 0.26 X10(3) UL (ref 0–0.7)
EOSINOPHIL NFR BLD AUTO: 5.8 %
ERYTHROCYTE [DISTWIDTH] IN BLOOD BY AUTOMATED COUNT: 12.1 % (ref 11–15)
EST. AVERAGE GLUCOSE BLD GHB EST-MCNC: 117 MG/DL (ref 68–126)
FASTING STATUS PATIENT QL REPORTED: NO
GLOBULIN PLAS-MCNC: 2.6 G/DL (ref 2.8–4.4)
GLUCOSE BLD-MCNC: 103 MG/DL (ref 70–99)
GLUCOSE UR-MCNC: NORMAL MG/DL
HBA1C MFR BLD: 5.7 % (ref ?–5.7)
HCT VFR BLD AUTO: 40.3 %
HGB BLD-MCNC: 13.4 G/DL
HGB UR QL STRIP.AUTO: NEGATIVE
HYALINE CASTS #/AREA URNS AUTO: PRESENT /LPF
IMM GRANULOCYTES # BLD AUTO: 0.01 X10(3) UL (ref 0–1)
IMM GRANULOCYTES NFR BLD: 0.2 %
INR BLD: 1.07 (ref 0.8–1.2)
LEUKOCYTE ESTERASE UR QL STRIP.AUTO: NEGATIVE
LYMPHOCYTES # BLD AUTO: 0.73 X10(3) UL (ref 1–4)
LYMPHOCYTES NFR BLD AUTO: 16.2 %
MCH RBC QN AUTO: 31.8 PG (ref 26–34)
MCHC RBC AUTO-ENTMCNC: 33.3 G/DL (ref 31–37)
MCV RBC AUTO: 95.5 FL
MONOCYTES # BLD AUTO: 0.58 X10(3) UL (ref 0.1–1)
MONOCYTES NFR BLD AUTO: 12.9 %
NEUTROPHILS # BLD AUTO: 2.91 X10 (3) UL (ref 1.5–7.7)
NEUTROPHILS # BLD AUTO: 2.91 X10(3) UL (ref 1.5–7.7)
NEUTROPHILS NFR BLD AUTO: 64.7 %
NITRITE UR QL STRIP.AUTO: NEGATIVE
OSMOLALITY SERPL CALC.SUM OF ELEC: 301 MOSM/KG (ref 275–295)
P AXIS: 0 DEGREES
P-R INTERVAL: 194 MS
PH UR: 5 [PH] (ref 5–8)
PLATELET # BLD AUTO: 151 10(3)UL (ref 150–450)
POTASSIUM SERPL-SCNC: 4.4 MMOL/L (ref 3.5–5.1)
PROT SERPL-MCNC: 6.7 G/DL (ref 5.7–8.2)
PROT UR-MCNC: 50 MG/DL
PROTHROMBIN TIME: 14.5 SECONDS (ref 11.6–14.8)
Q-T INTERVAL: 384 MS
QRS DURATION: 114 MS
QTC CALCULATION (BEZET): 440 MS
R AXIS: -28 DEGREES
RBC # BLD AUTO: 4.22 X10(6)UL
RBC #/AREA URNS AUTO: >10 /HPF
RH BLOOD TYPE: POSITIVE
SODIUM SERPL-SCNC: 142 MMOL/L (ref 136–145)
SP GR UR STRIP: >1.03 (ref 1–1.03)
T AXIS: 37 DEGREES
UROBILINOGEN UR STRIP-ACNC: 2
VENTRICULAR RATE: 79 BPM
WBC # BLD AUTO: 4.5 X10(3) UL (ref 4–11)

## 2024-03-01 PROCEDURE — 87081 CULTURE SCREEN ONLY: CPT

## 2024-03-01 PROCEDURE — 71046 X-RAY EXAM CHEST 2 VIEWS: CPT | Performed by: FAMILY MEDICINE

## 2024-03-01 PROCEDURE — 85730 THROMBOPLASTIN TIME PARTIAL: CPT

## 2024-03-01 PROCEDURE — 36415 COLL VENOUS BLD VENIPUNCTURE: CPT

## 2024-03-01 PROCEDURE — 83036 HEMOGLOBIN GLYCOSYLATED A1C: CPT

## 2024-03-01 PROCEDURE — 85610 PROTHROMBIN TIME: CPT

## 2024-03-01 PROCEDURE — 80053 COMPREHEN METABOLIC PANEL: CPT

## 2024-03-01 PROCEDURE — 86850 RBC ANTIBODY SCREEN: CPT | Performed by: FAMILY MEDICINE

## 2024-03-01 PROCEDURE — 85025 COMPLETE CBC W/AUTO DIFF WBC: CPT

## 2024-03-01 PROCEDURE — 87147 CULTURE TYPE IMMUNOLOGIC: CPT

## 2024-03-01 PROCEDURE — 99214 OFFICE O/P EST MOD 30 MIN: CPT | Performed by: FAMILY MEDICINE

## 2024-03-01 PROCEDURE — 93010 ELECTROCARDIOGRAM REPORT: CPT | Performed by: INTERNAL MEDICINE

## 2024-03-01 PROCEDURE — 93005 ELECTROCARDIOGRAM TRACING: CPT

## 2024-03-01 PROCEDURE — 86900 BLOOD TYPING SEROLOGIC ABO: CPT | Performed by: FAMILY MEDICINE

## 2024-03-01 PROCEDURE — 81001 URINALYSIS AUTO W/SCOPE: CPT

## 2024-03-01 PROCEDURE — 99499 UNLISTED E&M SERVICE: CPT | Performed by: FAMILY MEDICINE

## 2024-03-01 PROCEDURE — 86901 BLOOD TYPING SEROLOGIC RH(D): CPT | Performed by: FAMILY MEDICINE

## 2024-03-01 NOTE — H&P
[facility] PRE-OP CLINIC [place]    PRE-OP NOTE    HPI:   I have been consulted by Dr. Trent to see Bob Knox 83 year old male for a preoperative evaluation and medical clearance. He has a long history of worsening severe degenerative arthritis and lumbar spinal stenosis. Patient is to have a lumbar fusion procedure by Dr. Trent  on 3/7/2024.     Pt suffers significant pain and loss of function.     He has no cardiopulmonary symptoms.      He has no history of  DVT. Denies tobacco use.       Family History   Problem Relation Age of Onset    Cancer Mother         stomach cancer age 52      Current Outpatient Medications   Medication Sig Dispense Refill    aspirin 81 MG Oral Tab EC Take 1 tablet (81 mg total) by mouth daily.      diclofenac (VOLTAREN) 1 % External Gel Apply 2 g topically 4 (four) times daily. 1 each 0    Glucosamine-Chondroit-Vit C-Mn (GLUCOSAMINE 1500 COMPLEX OR) Take by mouth.      celecoxib 200 MG Oral Cap Take 1 capsule (200 mg total) by mouth daily.      folic acid 800 MCG Oral Tab Take 1 tablet (800 mcg total) by mouth.      Lisinopril-hydroCHLOROthiazide 20-12.5 MG Oral Tab Take 1 tablet by mouth.      atorvastatin 40 MG Oral Tab Take 1 tablet (40 mg total) by mouth daily.      Multiple Vitamins-Minerals (CENTRUM SILVER) Oral Tab Take 1 tablet by mouth daily.      cetirizine 10 MG Oral Tab Take 1 tablet (10 mg total) by mouth daily.      acetaminophen 500 MG Oral Tab Take 2 tablets (1,000 mg total) by mouth daily.      clotrimazole-betamethasone 1-0.05 % External Cream Apply to AA on the feet BID for 14 days (Patient not taking: Reported on 3/1/2024) 45 g 0     Past Medical History:   Diagnosis Date    Anesthesia complication     anxiety from  OR experience as a child    Back pain     Back problem     CAD (coronary artery disease)     Calculus of kidney     Essential hypertension     High blood pressure     High cholesterol     History of blood transfusion     Hyperkalemia     Sleep apnea      Visual impairment     readers     Past Surgical History:   Procedure Laterality Date    ARTHROSCOPY OF JOINT UNLISTED Bilateral     COLONOSCOPY      EGD  2019        TONSILLECTOMY      TOTAL KNEE REPLACEMENT Bilateral     right, left     Social History     Socioeconomic History    Marital status:      Spouse name: Not on file    Number of children: Not on file    Years of education: Not on file    Highest education level: Not on file   Occupational History    Not on file   Tobacco Use    Smoking status: Former     Packs/day: 1.00     Years: 30.00     Additional pack years: 0.00     Total pack years: 30.00     Types: Cigarettes     Quit date: 1999     Years since quittin.8    Smokeless tobacco: Never   Vaping Use    Vaping Use: Never used   Substance and Sexual Activity    Alcohol use: Not Currently     Alcohol/week: 2.0 - 3.0 standard drinks of alcohol     Types: 2 - 3 Standard drinks or equivalent per week     Comment: DAILY    Drug use: Not Currently    Sexual activity: Not on file   Other Topics Concern    Not on file   Social History Narrative    Not on file     Social Determinants of Health     Financial Resource Strain: Not on file   Food Insecurity: Not on file   Transportation Needs: Not on file   Physical Activity: Not on file   Stress: Not on file   Social Connections: Not on file   Housing Stability: Not on file       REVIEW OF SYSTEMS:   CONSTITUTIONAL:  Denies unusual weight gain/loss, fever, chills  EENT:  Eyes:  Denies eye pain, visual loss, blurred vision, double vision. Ears, Nose, Throat:  Denies congestion, runny nose or sore throat.  INTEGUMENTARY:  Denies rashes, itching, skin lesion,   CARDIOVASCULAR:  Denies DVT. Denies chest pain, palpitations, edema, dyspnea  RESPIRATORY: He does not use his CPAP for his BRAEDEN ,Denies shortness of breath, wheezing, cough  GASTROINTESTINAL:  Denies abdominal pain, nausea, vomiting, constipation, diarrhea, or blood in  stool.  MUSCULOSKELETAL: severe pain to his low back as noted above  NEUROLOGICAL:  Denies headache, seizures, dizziness, syncope, paralysis, ataxia,  HEMATOLOGIC:  Denies anemia, bleeding or bruising.  LYMPHATICS:  Denies enlarged nodes   PSYCHIATRIC:  Denies depression or anxiety.  ENDOCRINOLOGIC: DM 2 NO,   ALLERGIES:  Denies allergic response, history of asthma, hives,     EXAM:   /62 (BP Location: Left arm)   Pulse 101   Temp 97 °F (36.1 °C) (Temporal)   Resp 20   Ht 5' 5\" (1.651 m)   Wt 235 lb (106.6 kg)   SpO2 95%   BMI 39.11 kg/m²  Estimated body mass index is 39.11 kg/m² as calculated from the following:    Height as of this encounter: 5' 5\" (1.651 m).    Weight as of this encounter: 235 lb (106.6 kg).   Vital signs reviewed.Appears stated age, well groomed.  Physical Exam:  GEN:  Patient is alert, awake and oriented, well developed, well nourished, no apparent distress.  HEENT:  Head:  Normocephalic, atraumatic Nose: patent, no nasal discharge  NECK: Supple, no CLAD, no carotid bruit, no thyromegaly.  SKIN: No rashes, no skin lesion, no bruising, good turgor.  HEART:  Regular rate and rhythm, no murmurs, rubs or gallops.  LUNGS: Clear to auscultation bilterally, no rales/rhonchi/wheezing.  CHEST: No tenderness.  ABDOMEN:  Soft, nondistended, nontender,  no masses, no hepatosplenomegaly.  BACK: diffuse bilateral low back tenderness    EXTREMITIES:  No edema, no cyanosis, no clubbing,   NEURO:  No focal deficit, speech fluent, he has an antalgic  gait, strength and tone, sensory intact      Lab Results   Component Value Date    WBC 6.1 09/29/2023    HGB 14.7 09/29/2023    HCT 44.3 09/29/2023    .0 (L) 09/29/2023    CREATSERUM 1.54 (H) 09/29/2023    BUN 45 (H) 09/29/2023     09/29/2023    K 4.7 09/29/2023     09/29/2023    CO2 27.0 09/29/2023     (H) 09/29/2023    CA 9.3 09/29/2023    ALB 3.0 (L) 11/24/2019    ALKPHO 37 (L) 11/24/2019    BILT 0.4 11/24/2019    TP 5.7 (L)  11/24/2019    AST 23 11/24/2019    ALT 31 11/24/2019    PTT 29.9 05/14/2019    INR 1.08 05/14/2019    TSH 1.580 05/29/2019    DDIMER 0.62 09/29/2023    TROP <0.045 05/25/2019       No results found.          ASSESSMENT AND PLAN:   Bob Knox is a 83 year old male, with a hx of severe degenerative arthritis and lumbar spinal stenosis who presents for a pre-operative physical exam. Patient is to have a lumbar fusion procedure  by Dr. Trent  on 3/7/2024.      ICD-10-CM    1. Spinal stenosis of lumbar region with neurogenic claudication  M48.062       2. Essential hypertension  I10       3. Mixed hyperlipidemia  E78.2       4. Mild intermittent asthma without complication (HCC)  J45.20       5. Kyphoscoliosis  M41.9       6. History of alcohol use  Z87.898       7. History of kidney stones  Z87.442       8. BRAEDEN (obstructive sleep apnea)  G47.33       9. Postprocedural hypotension  I95.81       10. BMI 39.0-39.9,adult  Z68.39       11. Atherosclerosis of native coronary artery of native heart without angina pectoris  I25.10       12. 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 Preoperative examination, unspecified  Z01.818 CBC With Differential With Platelet     Comp Metabolic Panel (14)     EKG 12 Lead     MSSA and MRSA Culture Screen     Hemoglobin A1C     XR CHEST PA + LAT CHEST (CPT=71046)     Urinalysis with Culture Reflex     Type and screen     PTT, Activated     Prothrombin Time (PT)      14. Pre-operative laboratory examination  Z01.812 CBC With Differential With Platelet     Comp Metabolic Panel (14)     EKG 12 Lead     MSSA and MRSA Culture Screen     Hemoglobin A1C     XR CHEST PA + LAT CHEST (CPT=71046)     Urinalysis with Culture Reflex     Type and screen     PTT, Activated     Prothrombin Time (PT)      15. Elevated fasting blood sugar  R73.01 CBC With Differential With Platelet     Comp Metabolic Panel (14)     EKG 12 Lead     MSSA and MRSA  Culture Screen     Hemoglobin A1C     XR CHEST PA + LAT CHEST (CPT=71046)     Urinalysis with Culture Reflex     Type and screen     PTT, Activated     Prothrombin Time (PT)        Patient Active Problem List   Diagnosis    Spinal stenosis of lumbar region    Mild intermittent asthma without complication (HCC)    History of kidney stones    Essential hypertension    Mixed hyperlipidemia    BRAEDEN (obstructive sleep apnea)    History of alcohol use    Kyphoscoliosis    Postprocedural hypotension    Acute kidney failure (HCC)    Allergic reaction caused by a drug    Gastrointestinal hemorrhage    Cough        ECG and labs are pending review     Preoperative Risk Stratification: There are no decompensated medical conditions. ASA classification 2    Medical history:  High risk surgery (vascular, thoracic, intra-peritoneal): No  CAD: YES  CHF: No  Stroke: No  DM on insulin: No  Serum Creatinine >2 mg/dl: No  Patient has an RCRI score that is moderate risk and is at moderate risk for major cardiac event in the perioperative period.     Patient is medically optimized and has an acceptable risk of surgery and may proceed with surgery as planned.     PLAN:    Awaiting official Cardiac Clearance prior to surgery    Patient to discontinue medications and supplements with anticoagulation properties as per instruction.       Postoperative Recommendations:    Anticoagulation / DVT prophylaxis: SCDs, early ambulation.   GI protection: Protonix  Incentive Spirometry   Telemetry as needed  CPAP/O2 as needed   DM: QID glucoscans and sliding scale insulin as needed   Renal protection: (hydration / NSAID and ACE/ARB avoidance)   Cognitive protection: (minimize narcotics, benzodiazepines, scopolamine)     Pain management and Physical therapy as per Orthopedic service.   Home Health as needed    Thank you for the opportunity to care for your patient and to assist in managing the postoperative course.        David Cavazos DO  3/1/2024  10:59  AM

## 2024-03-01 NOTE — TELEPHONE ENCOUNTER
Left SI joint fusion on 03/15/24 with Dr. Trent at [facility]    H&P- completed 03/01/24  Labs- lymphocyte absolute (0.73), FBS (103), A1C (5.7), +MSSA, UA neg, BUN (32), BUN/Creat (25.8), Calc Osmo (301), GFR (58), globulin (2.6), all other labs WNL  EKG- sinus rhythm with marked sinu arrhythmia, incomplete RBBB, cannot rule out anterior infarct. Previous EKG in Cardio tab 04/09/21.  X-ray- cardiomegaly, tortuous aorta, prominent bibasilar pulmonary markings. No acute airspace consolidation.    Last seen by CHEL 05/14/19 lumbar fusion    Cardiology- Dr. Giselle Merritt BowmanPreeti  P- [phone]  F- [phone]  In office visit 02/07/24 in Encounters Tab:  If patient requres back surgery, will then consider 2D nuclear stress test.  Stress test scheduled for 03/11.  Spoke to Nataliia HAIDER with Dr. Merritt, she gave us DrAdin's cell for Dr. Trent to call and discuss but as far as they are concerned patient needs Imelda before surgery or they are not clearing him.   Nasrin HAIDER calling patient to let him know per Dr. Merritt he has to get this done prior to surgery.    ASA recs?

## 2024-03-02 NOTE — TELEPHONE ENCOUNTER
Patient's surgery has been moved from 03/07/24 to 03/15/24 since he could not get stress test or Cardiac Clearance prior to surgery.

## 2024-03-06 NOTE — TELEPHONE ENCOUNTER
Spoke to patient and his wife, went over test results, medications to stop, and that he needs to use Bactroban ointment in his nose fof 5 days prior to surgery BID (03/10-03/14) for +MSSA. Rx sent to pharmacy.    Will call patient back when we get stress test results/Cardiac Clx.

## 2024-03-13 NOTE — TELEPHONE ENCOUNTER
Cardiac Clearance scanned into [facility] 03/14/24:  \" Patient can proceed with spinal surgery with Dr. Trent on current medical therapy.   Stress test results as above is low to intermediate risk. The patient is at risk for a low moderate risk surgery. Further cardiac testing is unlikely to reduce risk. Patient is aware that the risk of complications from surgery is never zero, and that complications are often unpredictable.\"    Dr. Cavazos, ok for surgery?

## 2024-03-14 NOTE — TELEPHONE ENCOUNTER
Patient called to let us know Dr. Trent is cancelling his surgery tomorrow. Pt will follow up with us when rescheduled and feeling better.

## 2024-03-14 NOTE — TELEPHONE ENCOUNTER
Patient states since Monday he has had a cold but yesterday he started coughing up green sputum. No fever, chills. Minor cough, seems to be only in his chest not his sinuses.    Per CHEL continue taking Mucinex. Can order respiratory panel but patient would have to go to [facility].     Spoke to Dr. Trent's office, their team will determine if patient will go to surgery tomorrow and let us know.

## 2024-03-15 NOTE — TELEPHONE ENCOUNTER
Patient went to Urgent Care this mornring. He was given antibiotic and prednisone. Dr. Trent office would like to know if Dr. Cavazos thinks patient would maybe be ok to have surgery next Friday 03/22 and if so do we want to see him again in preop clinic?

## 2024-03-15 NOTE — TELEPHONE ENCOUNTER
He would need to be off antibiotic and asymptomatic and usually requires 2 weeks from diagnosis.

## 2024-03-19 NOTE — TELEPHONE ENCOUNTER
Patient called and is feeling 100% better and asymptomatic. Per Dr. Cavazos patient ok to do surgery on 03/22/24. Dr. Trent's office notified.

## 2024-05-17 ENCOUNTER — TELEPHONE (OUTPATIENT)
Dept: FAMILY MEDICINE CLINIC | Facility: CLINIC | Age: 84
End: 2024-05-17

## 2024-05-17 NOTE — TELEPHONE ENCOUNTER
Right SI Joint Fusion on 07/08/24 with Dr. Trent @ [facility]    H&P- completed 06/18/24  Labs- Lymphocyte absolute (0.92), BUN (42), Creatinine (1.55), BUN/Creat ratio (27.1), Calc Osmo (300), GFR (44), MRSA neg, all other labs WNL  EKG- sinus rhythm with PAC's, left axis deviation, incomplete RBBB. When compared to EKG done 03/01/24 PAC's are now present  CXR- Normal heart size.  No edema   Thoracic spine stimulator   Normal lung volumes.  Clear lungs   Stable mild right pleural thickening versus chronic small effusion   No pneumothorax     Cardiology- Dr. Giselle Merritt [facility]  P- [phone]  F- [phone]  NO Cardiac Clx needed per CHEL  ASA hold __  Previous Clx scanned in Media 03/14/24    Patient has appt with Pulmo next week.   Will need Clx per CHEL  Dr. Audie Villalobos  P- [phone]  F- [phone]  Pulmonary Clx req faxed 06/19/24    Refill on Lisinopril hydrochlorothiazide 20-12.5mg daily #30 sent to pharmacy per CHEL.

## 2024-06-18 ENCOUNTER — HOSPITAL ENCOUNTER (OUTPATIENT)
Dept: GENERAL RADIOLOGY | Facility: HOSPITAL | Age: 84
Discharge: HOME | End: 2024-06-18
Attending: FAMILY MEDICINE

## 2024-06-18 ENCOUNTER — LAB ENCOUNTER (OUTPATIENT)
Dept: LAB | Facility: HOSPITAL | Age: 84
End: 2024-06-18
Attending: FAMILY MEDICINE

## 2024-06-18 ENCOUNTER — OFFICE VISIT (OUTPATIENT)
Dept: FAMILY MEDICINE CLINIC | Facility: CLINIC | Age: 84
End: 2024-06-18

## 2024-06-18 ENCOUNTER — EKG ENCOUNTER (OUTPATIENT)
Dept: LAB | Facility: HOSPITAL | Age: 84
End: 2024-06-18
Attending: FAMILY MEDICINE

## 2024-06-18 VITALS
HEIGHT: 65 IN | TEMPERATURE: 97 F | DIASTOLIC BLOOD PRESSURE: 64 MMHG | WEIGHT: 239 LBS | BODY MASS INDEX: 39.82 KG/M2 | OXYGEN SATURATION: 92 % | HEART RATE: 90 BPM | RESPIRATION RATE: 18 BRPM | SYSTOLIC BLOOD PRESSURE: 102 MMHG

## 2024-06-18 DIAGNOSIS — Z01.812 PRE-OPERATIVE LABORATORY EXAMINATION: ICD-10-CM

## 2024-06-18 DIAGNOSIS — I25.10 ATHEROSCLEROSIS OF NATIVE CORONARY ARTERY OF NATIVE HEART WITHOUT ANGINA PECTORIS: ICD-10-CM

## 2024-06-18 DIAGNOSIS — R06.02 SHORTNESS OF BREATH: ICD-10-CM

## 2024-06-18 DIAGNOSIS — M48.062 SPINAL STENOSIS OF LUMBAR REGION WITH NEUROGENIC CLAUDICATION: Chronic | ICD-10-CM

## 2024-06-18 DIAGNOSIS — M46.1 SACROILIITIS (HCC): Primary | ICD-10-CM

## 2024-06-18 DIAGNOSIS — I95.81 POSTPROCEDURAL HYPOTENSION: ICD-10-CM

## 2024-06-18 DIAGNOSIS — Z01.818 PREOPERATIVE EXAMINATION, UNSPECIFIED: ICD-10-CM

## 2024-06-18 DIAGNOSIS — R05.1 ACUTE COUGH: ICD-10-CM

## 2024-06-18 DIAGNOSIS — R73.01 ELEVATED FASTING BLOOD SUGAR: ICD-10-CM

## 2024-06-18 DIAGNOSIS — G47.33 OSA (OBSTRUCTIVE SLEEP APNEA): ICD-10-CM

## 2024-06-18 DIAGNOSIS — I10 ESSENTIAL HYPERTENSION: Chronic | ICD-10-CM

## 2024-06-18 DIAGNOSIS — I47.9 TACHYCARDIA, PAROXYSMAL (HCC): ICD-10-CM

## 2024-06-18 DIAGNOSIS — J45.20 MILD INTERMITTENT ASTHMA WITHOUT COMPLICATION (HCC): Chronic | ICD-10-CM

## 2024-06-18 DIAGNOSIS — M41.9 KYPHOSCOLIOSIS: Chronic | ICD-10-CM

## 2024-06-18 DIAGNOSIS — Z87.898 HISTORY OF ALCOHOL USE: Chronic | ICD-10-CM

## 2024-06-18 DIAGNOSIS — E78.2 MIXED HYPERLIPIDEMIA: Chronic | ICD-10-CM

## 2024-06-18 DIAGNOSIS — Z87.442 HISTORY OF KIDNEY STONES: Chronic | ICD-10-CM

## 2024-06-18 LAB
ALBUMIN SERPL-MCNC: 4.3 G/DL (ref 3.2–4.8)
ALBUMIN/GLOB SERPL: 1.8 {RATIO} (ref 1–2)
ALP LIVER SERPL-CCNC: 69 U/L
ALT SERPL-CCNC: 30 U/L
ANION GAP SERPL CALC-SCNC: 5 MMOL/L (ref 0–18)
ANTIBODY SCREEN: NEGATIVE
APTT PPP: 28.5 SECONDS (ref 23–36)
AST SERPL-CCNC: 29 U/L (ref ?–34)
ATRIAL RATE: 85 BPM
BASOPHILS # BLD AUTO: 0.02 X10(3) UL (ref 0–0.2)
BASOPHILS NFR BLD AUTO: 0.4 %
BILIRUB SERPL-MCNC: 0.7 MG/DL (ref 0.2–1.1)
BILIRUB UR QL: NEGATIVE
BUN BLD-MCNC: 42 MG/DL (ref 9–23)
BUN/CREAT SERPL: 27.1 (ref 10–20)
CALCIUM BLD-MCNC: 9.7 MG/DL (ref 8.7–10.4)
CHLORIDE SERPL-SCNC: 108 MMOL/L (ref 98–112)
CLARITY UR: CLEAR
CO2 SERPL-SCNC: 27 MMOL/L (ref 21–32)
COLOR UR: YELLOW
CREAT BLD-MCNC: 1.55 MG/DL
DEPRECATED RDW RBC AUTO: 43.6 FL (ref 35.1–46.3)
EGFRCR SERPLBLD CKD-EPI 2021: 44 ML/MIN/1.73M2 (ref 60–?)
EOSINOPHIL # BLD AUTO: 0.17 X10(3) UL (ref 0–0.7)
EOSINOPHIL NFR BLD AUTO: 3.5 %
ERYTHROCYTE [DISTWIDTH] IN BLOOD BY AUTOMATED COUNT: 12.2 % (ref 11–15)
EST. AVERAGE GLUCOSE BLD GHB EST-MCNC: 111 MG/DL (ref 68–126)
FASTING STATUS PATIENT QL REPORTED: NO
GLOBULIN PLAS-MCNC: 2.4 G/DL (ref 2–3.5)
GLUCOSE BLD-MCNC: 98 MG/DL (ref 70–99)
GLUCOSE UR-MCNC: NORMAL MG/DL
HBA1C MFR BLD: 5.5 % (ref ?–5.7)
HCT VFR BLD AUTO: 41.6 %
HGB BLD-MCNC: 14 G/DL
HGB UR QL STRIP.AUTO: NEGATIVE
HYALINE CASTS #/AREA URNS AUTO: PRESENT /LPF
IMM GRANULOCYTES # BLD AUTO: 0.01 X10(3) UL (ref 0–1)
IMM GRANULOCYTES NFR BLD: 0.2 %
INR BLD: 1.05 (ref 0.8–1.2)
KETONES UR-MCNC: NEGATIVE MG/DL
LEUKOCYTE ESTERASE UR QL STRIP.AUTO: NEGATIVE
LYMPHOCYTES # BLD AUTO: 0.92 X10(3) UL (ref 1–4)
LYMPHOCYTES NFR BLD AUTO: 19 %
MCH RBC QN AUTO: 32.3 PG (ref 26–34)
MCHC RBC AUTO-ENTMCNC: 33.7 G/DL (ref 31–37)
MCV RBC AUTO: 96.1 FL
MONOCYTES # BLD AUTO: 0.58 X10(3) UL (ref 0.1–1)
MONOCYTES NFR BLD AUTO: 12 %
NEUTROPHILS # BLD AUTO: 3.15 X10 (3) UL (ref 1.5–7.7)
NEUTROPHILS # BLD AUTO: 3.15 X10(3) UL (ref 1.5–7.7)
NEUTROPHILS NFR BLD AUTO: 64.9 %
NITRITE UR QL STRIP.AUTO: NEGATIVE
OSMOLALITY SERPL CALC.SUM OF ELEC: 300 MOSM/KG (ref 275–295)
P AXIS: 38 DEGREES
P-R INTERVAL: 204 MS
PH UR: 5.5 [PH] (ref 5–8)
PLATELET # BLD AUTO: 153 10(3)UL (ref 150–450)
POTASSIUM SERPL-SCNC: 5.1 MMOL/L (ref 3.5–5.1)
PROT SERPL-MCNC: 6.7 G/DL (ref 5.7–8.2)
PROT UR-MCNC: 30 MG/DL
PROTHROMBIN TIME: 14.3 SECONDS (ref 11.6–14.8)
Q-T INTERVAL: 364 MS
QRS DURATION: 110 MS
QTC CALCULATION (BEZET): 433 MS
R AXIS: -34 DEGREES
RBC # BLD AUTO: 4.33 X10(6)UL
RH BLOOD TYPE: POSITIVE
SODIUM SERPL-SCNC: 140 MMOL/L (ref 136–145)
SP GR UR STRIP: 1.02 (ref 1–1.03)
T AXIS: 34 DEGREES
UROBILINOGEN UR STRIP-ACNC: NORMAL
VENTRICULAR RATE: 85 BPM
WBC # BLD AUTO: 4.9 X10(3) UL (ref 4–11)

## 2024-06-18 PROCEDURE — 86901 BLOOD TYPING SEROLOGIC RH(D): CPT | Performed by: FAMILY MEDICINE

## 2024-06-18 PROCEDURE — 87081 CULTURE SCREEN ONLY: CPT

## 2024-06-18 PROCEDURE — 85025 COMPLETE CBC W/AUTO DIFF WBC: CPT

## 2024-06-18 PROCEDURE — 83036 HEMOGLOBIN GLYCOSYLATED A1C: CPT

## 2024-06-18 PROCEDURE — 81001 URINALYSIS AUTO W/SCOPE: CPT

## 2024-06-18 PROCEDURE — 86850 RBC ANTIBODY SCREEN: CPT | Performed by: FAMILY MEDICINE

## 2024-06-18 PROCEDURE — 36415 COLL VENOUS BLD VENIPUNCTURE: CPT

## 2024-06-18 PROCEDURE — 99214 OFFICE O/P EST MOD 30 MIN: CPT | Performed by: FAMILY MEDICINE

## 2024-06-18 PROCEDURE — 80053 COMPREHEN METABOLIC PANEL: CPT

## 2024-06-18 PROCEDURE — 99499 UNLISTED E&M SERVICE: CPT | Performed by: FAMILY MEDICINE

## 2024-06-18 PROCEDURE — 85730 THROMBOPLASTIN TIME PARTIAL: CPT

## 2024-06-18 PROCEDURE — 93005 ELECTROCARDIOGRAM TRACING: CPT

## 2024-06-18 PROCEDURE — 85610 PROTHROMBIN TIME: CPT

## 2024-06-18 PROCEDURE — 71046 X-RAY EXAM CHEST 2 VIEWS: CPT | Performed by: FAMILY MEDICINE

## 2024-06-18 PROCEDURE — 93010 ELECTROCARDIOGRAM REPORT: CPT | Performed by: INTERNAL MEDICINE

## 2024-06-18 PROCEDURE — 86900 BLOOD TYPING SEROLOGIC ABO: CPT | Performed by: FAMILY MEDICINE

## 2024-06-18 RX ORDER — LISINOPRIL AND HYDROCHLOROTHIAZIDE 20; 12.5 MG/1; MG/1
1 TABLET ORAL DAILY
Qty: 30 TABLET | Refills: 0 | Status: SHIPPED | OUTPATIENT
Start: 2024-06-18

## 2024-06-18 NOTE — H&P
[facility] PRE-OP CLINIC [place]    PRE-OP NOTE    HPI:   I have been consulted by Dr. Trent to see Omid Knox 83 year old male for a preoperative evaluation and medical clearance. He has a long history of worsening severe degenerative arthritis and chronic right sided sacroiliitis . Patient is to have a right sacroiliac fusion  by Dr. Trent  on 7/8/2024.     Pt suffers significant pain and loss of function.     He is having some exertional dyspnea today with some wheezing      He has no history of  DVT. Denies tobacco use.      He has asthma and a history of BRAEDEN seeing Pulmonologist      Family History   Problem Relation Age of Onset    Cancer Mother         stomach cancer age 52      Current Outpatient Medications   Medication Sig Dispense Refill    Semaglutide (OZEMPIC, 1 MG/DOSE, SC) Inject 1 Dose into the skin once a week.      aspirin 81 MG Oral Tab EC Take 1 tablet (81 mg total) by mouth daily.      diclofenac (VOLTAREN) 1 % External Gel Apply 2 g topically 4 (four) times daily. 1 each 0    clotrimazole-betamethasone 1-0.05 % External Cream Apply to AA on the feet BID for 14 days 45 g 0    Glucosamine-Chondroit-Vit C-Mn (GLUCOSAMINE 1500 COMPLEX OR) Take by mouth.      celecoxib 200 MG Oral Cap Take 1 capsule (200 mg total) by mouth daily.      folic acid 800 MCG Oral Tab Take 1 tablet (800 mcg total) by mouth.      Lisinopril-hydroCHLOROthiazide 20-12.5 MG Oral Tab Take 1 tablet by mouth.      atorvastatin 40 MG Oral Tab Take 1 tablet (40 mg total) by mouth daily.      Multiple Vitamins-Minerals (CENTRUM SILVER) Oral Tab Take 1 tablet by mouth daily.      cetirizine 10 MG Oral Tab Take 1 tablet (10 mg total) by mouth daily.      acetaminophen 500 MG Oral Tab Take 2 tablets (1,000 mg total) by mouth daily.       Past Medical History:    Anesthesia complication    anxiety from  OR experience as a child    Back pain    Back problem    CAD (coronary artery disease)    Calculus of kidney    Essential  hypertension    High blood pressure    High cholesterol    History of blood transfusion    Hyperkalemia    Sleep apnea    Visual impairment    readers     Past Surgical History:   Procedure Laterality Date    Arthroscopy of joint unlisted Bilateral     Colonoscopy      Egd  2019        Tonsillectomy      Total knee replacement Bilateral     right, left     Social History     Socioeconomic History    Marital status:      Spouse name: Not on file    Number of children: Not on file    Years of education: Not on file    Highest education level: Not on file   Occupational History    Not on file   Tobacco Use    Smoking status: Former     Current packs/day: 0.00     Average packs/day: 1 pack/day for 30.0 years (30.0 ttl pk-yrs)     Types: Cigarettes     Start date: 1969     Quit date: 1999     Years since quittin.1     Passive exposure: Never    Smokeless tobacco: Never   Vaping Use    Vaping status: Never Used   Substance and Sexual Activity    Alcohol use: Not Currently     Alcohol/week: 2.0 - 3.0 standard drinks of alcohol     Types: 2 - 3 Standard drinks or equivalent per week     Comment: DAILY    Drug use: Not Currently    Sexual activity: Not on file   Other Topics Concern    Not on file   Social History Narrative    Not on file     Social Determinants of Health     Financial Resource Strain: Not on file   Food Insecurity: Not on file   Transportation Needs: Not on file   Physical Activity: Not on file   Stress: Not on file   Social Connections: Unknown (3/8/2021)    [facility]    Social Connections     Conversations with friends/family/neighbors per week: Not on file   Housing Stability: Low Risk  (2021)    Received from [facility], [facility]    Housing Stability     Mortgage Payment Concerns?: Not on file     Number of Places Lived in the Last Year: Not on file      Unstable Housing?: Not on file       REVIEW OF SYSTEMS:   CONSTITUTIONAL:  Denies unusual weight gain/loss, fever, chills  EENT:  Eyes:  Denies eye pain, visual loss, blurred vision, double vision. Ears, Nose, Throat:  Denies congestion, runny nose or sore throat.  INTEGUMENTARY:  Denies rashes, itching, skin lesion,   CARDIOVASCULAR:  Denies DVT. Denies chest pain, palpitations, edema, he does have a complaint of exertional dyspnea  RESPIRATORY: He does have  BRAEDEN and is complaining of some  shortness of breath, wheezing, and a cough  GASTROINTESTINAL:  Denies abdominal pain, nausea, vomiting, constipation, diarrhea, or blood in stool.  MUSCULOSKELETAL: severe pain to his low right back as noted above  NEUROLOGICAL:  Denies headache, seizures, dizziness, syncope, paralysis, ataxia,  HEMATOLOGIC:  Denies anemia, bleeding or bruising.  LYMPHATICS:  Denies enlarged nodes   PSYCHIATRIC:  Denies depression or anxiety.  ENDOCRINOLOGIC: DM 2 NO,   ALLERGIES:  Denies allergic response, history of asthma, hives,     EXAM:   /64 (BP Location: Left arm)   Pulse 90   Temp 96.8 °F (36 °C)   Resp 18   Ht 5' 5\" (1.651 m)   Wt 239 lb (108.4 kg)   SpO2 92%   BMI 39.77 kg/m²  Estimated body mass index is 39.77 kg/m² as calculated from the following:    Height as of this encounter: 5' 5\" (1.651 m).    Weight as of this encounter: 239 lb (108.4 kg).   Vital signs reviewed.Appears stated age, well groomed.  Physical Exam:  GEN:  Patient is alert, awake and oriented, well developed, well nourished, no apparent distress.  HEENT:  Head:  Normocephalic, atraumatic Nose: patent, no nasal discharge  NECK: Supple, no CLAD, no carotid bruit, no thyromegaly.  SKIN: No rashes, no skin lesion, no bruising, good turgor.  HEART:  Tachycardic  rate and rhythm, no murmurs, rubs or gallops.  LUNGS: Diminished to auscultation bilterally, no rales/rhonchi/wheezing.  CHEST: No tenderness.  ABDOMEN:  Soft and obese distended, nontender,  no  masses, no hepatosplenomegaly.  BACK: No tenderness, no spasm,   EXTREMITIES:  No edema, no cyanosis, no clubbing,   NEURO:  No focal deficit, speech fluent, he has an antalgic  gait, strength and tone, sensory intact      Lab Results   Component Value Date    WBC 4.5 03/01/2024    HGB 13.4 03/01/2024    HCT 40.3 03/01/2024    .0 03/01/2024    CREATSERUM 1.24 03/01/2024    BUN 32 (H) 03/01/2024     03/01/2024    K 4.4 03/01/2024     03/01/2024    CO2 26.0 03/01/2024     (H) 03/01/2024    CA 9.7 03/01/2024    ALB 4.1 03/01/2024    ALKPHO 65 03/01/2024    BILT 0.6 03/01/2024    TP 6.7 03/01/2024    AST 27 03/01/2024    ALT 30 03/01/2024    PTT 28.8 03/01/2024    INR 1.07 03/01/2024    TSH 1.580 05/29/2019    DDIMER 0.62 09/29/2023    TROP <0.045 05/25/2019       No results found.          ASSESSMENT AND PLAN:   Omid Knox is a 83 year old male, with a hx of severe degenerative arthritis and right sided sacroiliitis  who presents for a pre-operative physical exam. Patient is to have a right sided sacroiliac fusion  by Dr. Trent  on 7/8/2024.      ICD-10-CM    1. Right sided Sacroiliitis (HCC)  M46.1       2. Spinal stenosis of lumbar region with neurogenic claudication S/P lumbar fusion 3/2024  M48.062       3. Atherosclerosis of native coronary artery of native heart without angina pectoris  I25.10       4. Tachycardia, paroxysmal (HCC)  I47.9       5. Shortness of breath  R06.02 CBC With Differential With Platelet     Comp Metabolic Panel (14)     MSSA and MRSA Culture Screen     Urinalysis with Culture Reflex     Type and screen     PTT, Activated     Prothrombin Time (PT)     Hemoglobin A1C     EKG 12 Lead     XR CHEST PA + LAT CHEST (CPT=71046)      6. Acute cough  R05.1       7. Postprocedural hypotension  I95.81       8. Mild intermittent asthma without complication (HCC)  J45.20       9. BRAEDEN (obstructive sleep apnea)  G47.33       10. Essential hypertension  I10       11.  Mixed hyperlipidemia  E78.2       12. Kyphoscoliosis  M41.9       13. History of alcohol use  Z87.898       14. History of kidney stones  Z87.442       15. BMI 39.0-39.9,adult  Z68.39       16. Elevated fasting blood sugar  R73.01 CBC With Differential With Platelet     Comp Metabolic Panel (14)     MSSA and MRSA Culture Screen     Urinalysis with Culture Reflex     Type and screen     PTT, Activated     Prothrombin Time (PT)     Hemoglobin A1C     EKG 12 Lead     XR CHEST PA + LAT CHEST (CPT=71046)      17. Preoperative examination, unspecified  Z01.818 CBC With Differential With Platelet     Comp Metabolic Panel (14)     MSSA and MRSA Culture Screen     Urinalysis with Culture Reflex     Type and screen     PTT, Activated     Prothrombin Time (PT)     Hemoglobin A1C     EKG 12 Lead     XR CHEST PA + LAT CHEST (CPT=71046)      18. Pre-operative laboratory examination  Z01.812 CBC With Differential With Platelet     Comp Metabolic Panel (14)     MSSA and MRSA Culture Screen     Urinalysis with Culture Reflex     Type and screen     PTT, Activated     Prothrombin Time (PT)     Hemoglobin A1C     EKG 12 Lead     XR CHEST PA + LAT CHEST (NPP=10581)        Patient Active Problem List   Diagnosis    Spinal stenosis of lumbar region    Mild intermittent asthma without complication (HCC)    History of kidney stones    Essential hypertension    Mixed hyperlipidemia    BRAEDEN (obstructive sleep apnea)    History of alcohol use    Kyphoscoliosis    Postprocedural hypotension    Acute kidney failure (HCC)    Allergic reaction caused by a drug    Gastrointestinal hemorrhage    Cough        ECG and labs are pending review     Preoperative Risk Stratification: There are no decompensated medical conditions. ASA classification 3    Medical history:  High risk surgery (vascular, thoracic, intra-peritoneal): No  CAD: YES  CHF: No  Stroke: No  DM on insulin: No  Serum Creatinine >2 mg/dl: No  Patient has an RCRI score that is moderate   risk and is at moderate  risk for major cardiac event in the perioperative period.     Patient will need to be  medically optimized and will need to be  an acceptable risk for  surgery and may proceed with surgery as planned once preoperative studies reveal he is stable from a Cardiac and Pulmonary standpoint      PLAN:    Patient to discontinue medications and supplements with anticoagulation properties as per instruction.        Postoperative Recommendations:    Anticoagulation / DVT prophylaxis: SCDs, early ambulatio  GI protection: Protonix  Incentive Spirometry   Telemetry as needed  CPAP/O2 as needed   DM: QID glucoscans and sliding scale insulin as needed   Renal protection: (hydration / NSAID and ACE/ARB avoidance)   Cognitive protection: (minimize narcotics, benzodiazepines, scopolamine)     Pain management and Physical therapy as per Orthopedic service.   Home Health as needed    Thank you for the opportunity to care for your patient and to assist in managing the postoperative course.        David Cavazos DO  6/18/2024  10:46 AM

## 2024-06-19 NOTE — TELEPHONE ENCOUNTER
So he had a stress test in March of this year that was OK , he was cleared by Cardiology, and currently has no chest pain or symptoms of angina at this time. He is considered optimized to proceed to surgery as long as he has no respiratory symptoms prior to surgery and gets the Pulmonary clearance. Send EKG to Cardiology for their  records.

## 2024-06-21 NOTE — TELEPHONE ENCOUNTER
Spoke to patient, he will call Pulmo to try and get in early. All [facility] are booking out until October. Patient is also aware he is holding ASA 1 week prior to surgery.

## 2024-07-09 PROBLEM — I25.10 ATHEROSCLEROSIS OF NATIVE CORONARY ARTERY WITHOUT ANGINA PECTORIS: Status: ACTIVE | Noted: 2024-07-09

## 2024-07-09 PROBLEM — M46.1 SACROILIITIS: Status: ACTIVE | Noted: 2024-07-09

## 2024-07-09 PROBLEM — R06.02 SHORTNESS OF BREATH: Status: ACTIVE | Noted: 2024-07-09

## 2024-07-09 PROBLEM — I10 PRIMARY HYPERTENSION: Status: ACTIVE | Noted: 2024-07-09

## 2024-07-09 PROBLEM — M46.1 SACROILIITIS (HCC): Status: ACTIVE | Noted: 2024-07-09

## 2024-07-12 ENCOUNTER — APPOINTMENT (OUTPATIENT)
Dept: GENERAL RADIOLOGY | Facility: HOSPITAL | Age: 84
End: 2024-07-12
Attending: ORTHOPAEDIC SURGERY
Payer: MEDICARE

## 2024-07-12 ENCOUNTER — ANESTHESIA (OUTPATIENT)
Dept: SURGERY | Facility: HOSPITAL | Age: 84
End: 2024-07-12
Payer: MEDICARE

## 2024-07-12 ENCOUNTER — HOSPITAL ENCOUNTER (OUTPATIENT)
Facility: HOSPITAL | Age: 84
Setting detail: HOSPITAL OUTPATIENT SURGERY
Discharge: HOME | End: 2024-07-12
Attending: ORTHOPAEDIC SURGERY | Admitting: ORTHOPAEDIC SURGERY
Payer: MEDICARE

## 2024-07-12 ENCOUNTER — ANESTHESIA EVENT (OUTPATIENT)
Dept: SURGERY | Facility: HOSPITAL | Age: 84
End: 2024-07-12
Payer: MEDICARE

## 2024-07-12 VITALS
WEIGHT: 239 LBS | HEIGHT: 65 IN | RESPIRATION RATE: 16 BRPM | HEART RATE: 51 BPM | TEMPERATURE: 98 F | BODY MASS INDEX: 39.82 KG/M2 | OXYGEN SATURATION: 93 % | SYSTOLIC BLOOD PRESSURE: 141 MMHG | DIASTOLIC BLOOD PRESSURE: 87 MMHG

## 2024-07-12 PROBLEM — Z98.1 HISTORY OF SPINAL FUSION: Status: ACTIVE | Noted: 2024-07-12

## 2024-07-12 PROCEDURE — 0SG734Z FUSION OF RIGHT SACROILIAC JOINT WITH INTERNAL FIXATION DEVICE, PERCUTANEOUS APPROACH: ICD-10-PCS | Performed by: ORTHOPAEDIC SURGERY

## 2024-07-12 PROCEDURE — 97116 GAIT TRAINING THERAPY: CPT

## 2024-07-12 PROCEDURE — 76000 FLUOROSCOPY <1 HR PHYS/QHP: CPT | Performed by: ORTHOPAEDIC SURGERY

## 2024-07-12 PROCEDURE — 97535 SELF CARE MNGMENT TRAINING: CPT

## 2024-07-12 PROCEDURE — 0SG73KZ FUSION OF RIGHT SACROILIAC JOINT WITH NONAUTOLOGOUS TISSUE SUBSTITUTE, PERCUTANEOUS APPROACH: ICD-10-PCS | Performed by: ORTHOPAEDIC SURGERY

## 2024-07-12 PROCEDURE — 97161 PT EVAL LOW COMPLEX 20 MIN: CPT

## 2024-07-12 PROCEDURE — 97165 OT EVAL LOW COMPLEX 30 MIN: CPT

## 2024-07-12 DEVICE — GRAFT BNE SUB PTTY 6CC ALLGRFT DBF DBM FD: Type: IMPLANTABLE DEVICE | Site: BACK | Status: FUNCTIONAL

## 2024-07-12 DEVICE — DEVICE 74200001240 THREADED 12X40
Type: IMPLANTABLE DEVICE | Site: BACK | Status: FUNCTIONAL
Brand: RIALTO™ SI FUSION SYSTEM

## 2024-07-12 RX ORDER — MAGNESIUM SULFATE HEPTAHYDRATE 500 MG/ML
INJECTION, SOLUTION INTRAMUSCULAR; INTRAVENOUS AS NEEDED
Status: DISCONTINUED | OUTPATIENT
Start: 2024-07-12 | End: 2024-07-12 | Stop reason: SURG

## 2024-07-12 RX ORDER — LIDOCAINE HYDROCHLORIDE 10 MG/ML
INJECTION, SOLUTION EPIDURAL; INFILTRATION; INTRACAUDAL; PERINEURAL AS NEEDED
Status: DISCONTINUED | OUTPATIENT
Start: 2024-07-12 | End: 2024-07-12 | Stop reason: SURG

## 2024-07-12 RX ORDER — BISACODYL 10 MG
10 SUPPOSITORY, RECTAL RECTAL
OUTPATIENT
Start: 2024-07-12

## 2024-07-12 RX ORDER — METOCLOPRAMIDE 10 MG/1
10 TABLET ORAL ONCE
Status: COMPLETED | OUTPATIENT
Start: 2024-07-12 | End: 2024-07-12

## 2024-07-12 RX ORDER — TIZANIDINE 4 MG/1
4 TABLET ORAL EVERY 8 HOURS PRN
Status: DISCONTINUED | OUTPATIENT
Start: 2024-07-12 | End: 2024-07-12

## 2024-07-12 RX ORDER — SODIUM CHLORIDE, SODIUM LACTATE, POTASSIUM CHLORIDE, CALCIUM CHLORIDE 600; 310; 30; 20 MG/100ML; MG/100ML; MG/100ML; MG/100ML
INJECTION, SOLUTION INTRAVENOUS CONTINUOUS
Status: DISCONTINUED | OUTPATIENT
Start: 2024-07-12 | End: 2024-07-12

## 2024-07-12 RX ORDER — DIPHENHYDRAMINE HCL 25 MG
25 CAPSULE ORAL EVERY 4 HOURS PRN
OUTPATIENT
Start: 2024-07-12

## 2024-07-12 RX ORDER — BUPIVACAINE HYDROCHLORIDE AND EPINEPHRINE 5; 5 MG/ML; UG/ML
INJECTION, SOLUTION PERINEURAL AS NEEDED
Status: DISCONTINUED | OUTPATIENT
Start: 2024-07-12 | End: 2024-07-12 | Stop reason: HOSPADM

## 2024-07-12 RX ORDER — ACETAMINOPHEN 500 MG
1000 TABLET ORAL ONCE
Status: COMPLETED | OUTPATIENT
Start: 2024-07-12 | End: 2024-07-12

## 2024-07-12 RX ORDER — MORPHINE SULFATE 4 MG/ML
2 INJECTION, SOLUTION INTRAMUSCULAR; INTRAVENOUS EVERY 10 MIN PRN
Status: DISCONTINUED | OUTPATIENT
Start: 2024-07-12 | End: 2024-07-12

## 2024-07-12 RX ORDER — ROCURONIUM BROMIDE 10 MG/ML
INJECTION, SOLUTION INTRAVENOUS AS NEEDED
Status: DISCONTINUED | OUTPATIENT
Start: 2024-07-12 | End: 2024-07-12 | Stop reason: SURG

## 2024-07-12 RX ORDER — CLINDAMYCIN PHOSPHATE 150 MG/ML
INJECTION, SOLUTION INTRAVENOUS AS NEEDED
Status: DISCONTINUED | OUTPATIENT
Start: 2024-07-12 | End: 2024-07-12 | Stop reason: SURG

## 2024-07-12 RX ORDER — CETIRIZINE HYDROCHLORIDE 10 MG/1
10 TABLET ORAL DAILY
Status: CANCELLED | OUTPATIENT
Start: 2024-07-13

## 2024-07-12 RX ORDER — PHENYLEPHRINE HCL 10 MG/ML
VIAL (ML) INJECTION AS NEEDED
Status: DISCONTINUED | OUTPATIENT
Start: 2024-07-12 | End: 2024-07-12 | Stop reason: SURG

## 2024-07-12 RX ORDER — HYDROMORPHONE HYDROCHLORIDE 1 MG/ML
0.2 INJECTION, SOLUTION INTRAMUSCULAR; INTRAVENOUS; SUBCUTANEOUS EVERY 5 MIN PRN
Status: DISCONTINUED | OUTPATIENT
Start: 2024-07-12 | End: 2024-07-12

## 2024-07-12 RX ORDER — DIPHENHYDRAMINE HYDROCHLORIDE 50 MG/ML
25 INJECTION INTRAMUSCULAR; INTRAVENOUS EVERY 4 HOURS PRN
OUTPATIENT
Start: 2024-07-12

## 2024-07-12 RX ORDER — ONDANSETRON 2 MG/ML
4 INJECTION INTRAMUSCULAR; INTRAVENOUS EVERY 6 HOURS PRN
OUTPATIENT
Start: 2024-07-12

## 2024-07-12 RX ORDER — DEXAMETHASONE SODIUM PHOSPHATE 4 MG/ML
VIAL (ML) INJECTION AS NEEDED
Status: DISCONTINUED | OUTPATIENT
Start: 2024-07-12 | End: 2024-07-12 | Stop reason: SURG

## 2024-07-12 RX ORDER — METOCLOPRAMIDE HYDROCHLORIDE 5 MG/ML
10 INJECTION INTRAMUSCULAR; INTRAVENOUS EVERY 8 HOURS PRN
OUTPATIENT
Start: 2024-07-12

## 2024-07-12 RX ORDER — POLYETHYLENE GLYCOL 3350 17 G/17G
17 POWDER, FOR SOLUTION ORAL DAILY PRN
OUTPATIENT
Start: 2024-07-12

## 2024-07-12 RX ORDER — MORPHINE SULFATE 10 MG/ML
6 INJECTION, SOLUTION INTRAMUSCULAR; INTRAVENOUS EVERY 10 MIN PRN
Status: DISCONTINUED | OUTPATIENT
Start: 2024-07-12 | End: 2024-07-12

## 2024-07-12 RX ORDER — DIAZEPAM 5 MG/1
5 TABLET ORAL EVERY 6 HOURS PRN
OUTPATIENT
Start: 2024-07-12

## 2024-07-12 RX ORDER — HYDROMORPHONE HYDROCHLORIDE 1 MG/ML
0.6 INJECTION, SOLUTION INTRAMUSCULAR; INTRAVENOUS; SUBCUTANEOUS EVERY 5 MIN PRN
Status: DISCONTINUED | OUTPATIENT
Start: 2024-07-12 | End: 2024-07-12

## 2024-07-12 RX ORDER — MORPHINE SULFATE 4 MG/ML
4 INJECTION, SOLUTION INTRAMUSCULAR; INTRAVENOUS EVERY 10 MIN PRN
Status: DISCONTINUED | OUTPATIENT
Start: 2024-07-12 | End: 2024-07-12

## 2024-07-12 RX ORDER — ONDANSETRON 2 MG/ML
INJECTION INTRAMUSCULAR; INTRAVENOUS AS NEEDED
Status: DISCONTINUED | OUTPATIENT
Start: 2024-07-12 | End: 2024-07-12 | Stop reason: SURG

## 2024-07-12 RX ORDER — DOCUSATE SODIUM 100 MG/1
100 CAPSULE, LIQUID FILLED ORAL 2 TIMES DAILY
OUTPATIENT
Start: 2024-07-12

## 2024-07-12 RX ORDER — ATORVASTATIN CALCIUM 40 MG/1
40 TABLET, FILM COATED ORAL DAILY
Status: CANCELLED | OUTPATIENT
Start: 2024-07-13

## 2024-07-12 RX ORDER — FAMOTIDINE 20 MG/1
20 TABLET, FILM COATED ORAL ONCE
Status: COMPLETED | OUTPATIENT
Start: 2024-07-12 | End: 2024-07-12

## 2024-07-12 RX ORDER — FAMOTIDINE 10 MG/ML
20 INJECTION, SOLUTION INTRAVENOUS ONCE
Status: COMPLETED | OUTPATIENT
Start: 2024-07-12 | End: 2024-07-12

## 2024-07-12 RX ORDER — CLINDAMYCIN PHOSPHATE 900 MG/50ML
900 INJECTION, SOLUTION INTRAVENOUS ONCE
Status: DISCONTINUED | OUTPATIENT
Start: 2024-07-12 | End: 2024-07-12 | Stop reason: HOSPADM

## 2024-07-12 RX ORDER — HYDROMORPHONE HYDROCHLORIDE 1 MG/ML
0.4 INJECTION, SOLUTION INTRAMUSCULAR; INTRAVENOUS; SUBCUTANEOUS EVERY 5 MIN PRN
Status: DISCONTINUED | OUTPATIENT
Start: 2024-07-12 | End: 2024-07-12

## 2024-07-12 RX ORDER — METOCLOPRAMIDE HYDROCHLORIDE 5 MG/ML
10 INJECTION INTRAMUSCULAR; INTRAVENOUS ONCE
Status: COMPLETED | OUTPATIENT
Start: 2024-07-12 | End: 2024-07-12

## 2024-07-12 RX ORDER — ENEMA 19; 7 G/133ML; G/133ML
1 ENEMA RECTAL ONCE AS NEEDED
OUTPATIENT
Start: 2024-07-12

## 2024-07-12 RX ORDER — NALOXONE HYDROCHLORIDE 0.4 MG/ML
0.08 INJECTION, SOLUTION INTRAMUSCULAR; INTRAVENOUS; SUBCUTANEOUS AS NEEDED
Status: DISCONTINUED | OUTPATIENT
Start: 2024-07-12 | End: 2024-07-12

## 2024-07-12 RX ORDER — SENNOSIDES 8.6 MG
17.2 TABLET ORAL NIGHTLY
OUTPATIENT
Start: 2024-07-12

## 2024-07-12 RX ORDER — LISINOPRIL AND HYDROCHLOROTHIAZIDE 20; 12.5 MG/1; MG/1
1 TABLET ORAL DAILY
Status: CANCELLED | OUTPATIENT
Start: 2024-07-13

## 2024-07-12 RX ORDER — EPHEDRINE SULFATE 50 MG/ML
INJECTION, SOLUTION INTRAVENOUS AS NEEDED
Status: DISCONTINUED | OUTPATIENT
Start: 2024-07-12 | End: 2024-07-12 | Stop reason: SURG

## 2024-07-12 RX ORDER — TRAMADOL HYDROCHLORIDE 50 MG/1
50 TABLET ORAL EVERY 4 HOURS PRN
Status: DISCONTINUED | OUTPATIENT
Start: 2024-07-12 | End: 2024-07-12

## 2024-07-12 RX ADMIN — LIDOCAINE HYDROCHLORIDE 50 MG: 10 INJECTION, SOLUTION EPIDURAL; INFILTRATION; INTRACAUDAL; PERINEURAL at 09:14:00

## 2024-07-12 RX ADMIN — CLINDAMYCIN PHOSPHATE 900 MG: 150 INJECTION, SOLUTION INTRAVENOUS at 09:18:00

## 2024-07-12 RX ADMIN — ROCURONIUM BROMIDE 40 MG: 10 INJECTION, SOLUTION INTRAVENOUS at 09:17:00

## 2024-07-12 RX ADMIN — MAGNESIUM SULFATE HEPTAHYDRATE 1 G: 500 INJECTION, SOLUTION INTRAMUSCULAR; INTRAVENOUS at 09:50:00

## 2024-07-12 RX ADMIN — DEXAMETHASONE SODIUM PHOSPHATE 4 MG: 4 MG/ML VIAL (ML) INJECTION at 09:34:00

## 2024-07-12 RX ADMIN — ONDANSETRON 4 MG: 2 INJECTION INTRAMUSCULAR; INTRAVENOUS at 09:34:00

## 2024-07-12 RX ADMIN — PHENYLEPHRINE HCL 100 MCG: 10 MG/ML VIAL (ML) INJECTION at 09:50:00

## 2024-07-12 RX ADMIN — EPHEDRINE SULFATE 5 MG: 50 INJECTION, SOLUTION INTRAVENOUS at 10:17:00

## 2024-07-12 RX ADMIN — PHENYLEPHRINE HCL 100 MCG: 10 MG/ML VIAL (ML) INJECTION at 09:42:00

## 2024-07-12 RX ADMIN — ROCURONIUM BROMIDE 10 MG: 10 INJECTION, SOLUTION INTRAVENOUS at 10:09:00

## 2024-07-12 RX ADMIN — SODIUM CHLORIDE, SODIUM LACTATE, POTASSIUM CHLORIDE, CALCIUM CHLORIDE: 600; 310; 30; 20 INJECTION, SOLUTION INTRAVENOUS at 09:12:00

## 2024-07-12 RX ADMIN — EPHEDRINE SULFATE 5 MG: 50 INJECTION, SOLUTION INTRAVENOUS at 09:52:00

## 2024-07-12 NOTE — DISCHARGE INSTRUCTIONS
DISCHARGE INSTRUCTIONS    -- HOLD VITAMINS AND SUPPLEMENTS AND ASPIRIN FOR 1 WEEK.    --HOLD NSAIDS FOR 12 WEEKS.    -- FOLLOW HAND OUT INSTRUCTIONS ATTACHED TO DISCHARGE PAPERWORK

## 2024-07-12 NOTE — ANESTHESIA PROCEDURE NOTES
Airway  Date/Time: 7/12/2024 9:21 AM  Urgency: elective      General Information and Staff    Patient location during procedure: OR  Anesthesiologist: Adam Worley MD  Performed: anesthesiologist   Performed by: Adam Worley MD  Authorized by: Adam Worley MD      Indications and Patient Condition  Indications for airway management: anesthesia  Sedation level: deep  Preoxygenated: yes  Patient position: sniffing  Mask difficulty assessment: 2 - vent by mask + OA or adjuvant +/- NMBA    Final Airway Details  Final airway type: endotracheal airway      Successful airway: ETT  Cuffed: yes   Successful intubation technique: Video laryngoscopy  Endotracheal tube insertion site: oral  Blade size: #3  ETT size (mm): 7.5    Cormack-Lehane Classification: grade I - full view of glottis  Placement verified by: capnometry   Measured from: lips  ETT to lips (cm): 21  Number of attempts at approach: 1    Additional Comments  ATRAUMATIC X1, SOFT BITE BLOCK PLACED

## 2024-07-12 NOTE — ANESTHESIA POSTPROCEDURE EVALUATION
Patient: Omid Knox    Procedure Summary       Date: 07/12/24 Room / Location: [facility] MAIN OR  / [facility] MAIN OR    Anesthesia Start: 0912 Anesthesia Stop: 1041    Procedure: Right sacroiliac joint fusion (Right: Spine Lumbar) Diagnosis: (Sacrococcygeal disorders)    Surgeons: Zhao Trent MD Anesthesiologist: Adam Worley MD    Anesthesia Type: general ASA Status: 3            Anesthesia Type: general    Vitals Value Taken Time   /85 07/12/24 1037   Temp 97 °F (36.1 °C) 07/12/24 1034   Pulse 66 07/12/24 1041   Resp 21 07/12/24 1041   SpO2 88 % 07/12/24 1041   Vitals shown include unfiled device data.    [facility] AN Post Evaluation:   Patient Evaluated in PACU  Patient Participation: complete - patient participated  Level of Consciousness: awake and alert  Pain Management: adequate  Airway Patency:patent  Yes    Cardiovascular Status: acceptable  Respiratory Status: acceptable and nasal cannula  Postoperative Hydration acceptable      Adam Worley MD  7/12/2024 10:41 AM

## 2024-07-12 NOTE — OCCUPATIONAL THERAPY NOTE
OCCUPATIONAL THERAPY EVALUATION - INPATIENT     [facility]  Evaluation Date: 2024  Type of Evaluation: Initial     Reason for admission: R SI joint fusion  Physician Order: IP Consult to Occupational Therapy  Reason for Therapy: ADL/IADL Dysfunction and Discharge Planning    OCCUPATIONAL THERAPY ASSESSMENT   Patient is a 83 year old male admitted 2024.  Prior to admission, patient's baseline is independent-mod I.  Patient is currently functioning near baseline with ADLs/mobility.    PLAN  Patient has been evaluated and presents with no skilled Occupational Therapy needs  at this time.  Patient will be discharged from Occupational Therapy services. Please re-order if a new functional limitation presents during this admission.         OCCUPATIONAL THERAPY MEDICAL/SOCIAL HISTORY   Problem List  Principal Problem:    Sacroiliitis (HCC)  Active Problems:    Spinal stenosis of lumbar region    Mild intermittent asthma without complication (HCC)    History of kidney stones    Essential hypertension    Mixed hyperlipidemia    BRAEDEN (obstructive sleep apnea)    Atherosclerosis of native coronary artery without angina pectoris    Shortness of breath    Primary hypertension    History of spinal fusion    HOME SITUATION  Type of Home: House  Lives With: Spouse  Toilet and Equipment: Comfort height toilet  Shower/Tub and Equipment: Walk-in shower  Other Equipment: Reacher  Drives: Yes      Prior Level of [place]: mod I-I    SUBJECTIVE  \"I am ready to go home\"    OCCUPATIONAL THERAPY EXAMINATION    OBJECTIVE  Precautions: spine     WEIGHT BEARING RESTRICTION  Start with toe touch as tolerate and advance to weight bearing as tolerated. - confirmed with surgeon's PA, if pt can manage WBAT with tolerable pain, can be WBAT with RW      PAIN ASSESSMENT  Ratin  Location: R side of back  Management Techniques: Repositioning; Relaxation      COGNITION  Overall Cognitive Status:  WFL - within functional  limits    VISION  Current Vision: no visual deficits    Communication: WFL    Behavioral/Emotional/Social: WFL, quick moving and cues to slow down for safety    RANGE OF MOTION   Upper extremity ROM is within functional limits     STRENGTH ASSESSMENT  Upper extremity strength NT, spine prec maintained; WFL during ADLs    COORDINATION  Gross Motor: WFL   Fine Motor: WFL     ACTIVITIES OF DAILY LIVING ASSESSMENT  AM-PAC ‘6-Clicks’ Inpatient Daily Activity Short Form  How much help from another person does the patient currently need…  -   Putting on and taking off regular lower body clothing?: A Little  -   Bathing (including washing, rinsing, drying)?: A Little  -   Toileting, which includes using toilet, bedpan or urinal? : None  -   Putting on and taking off regular upper body clothing?: None  -   Taking care of personal grooming such as brushing teeth?: None  -   Eating meals?: None    AM-PAC Score:  Score: 22  Approx Degree of Impairment: 25.8%  Standardized Score (AM-PAC Scale): 47.1  CMS Modifier (G-Code): CJ    FUNCTIONAL TRANSFER ASSESSMENT  Sit to Stand: Edge of Bed  Edge of Bed: Contact Guard Assist (progressing to SPV and FWW)  Functional mobility SBA-SPV FWW/RW    BED MOBILITY  Rolling: Contact Guard Assist  Supine to Sit : Contact Guard Assist (educated on log roll)    BALANCE ASSESSMENT  Static Sitting: Modified Independent  Static Standing: Supervision    FUNCTIONAL ADL ASSESSMENT  Eating: Independent  Grooming Standing: Supervision (hand hygiene at sink)  UB Dressing Standing: Supervision (donning overhead shirt)  LB Dressing Seated: Contact Guard Assist (donning pants with reacher, max A doffing socks, set-up donning slip on shoes)  Toileting Standing: Supervision (standing urination)    Skilled Therapy Provided: RN approved session,handoff from PT. Received patient in supine. Vitals: stable. Performed ADLs/functional mobility/transfers as stated above. Primarily limited by pain,spine prec.  Provided  skilled cues/education on OT role, POC, functional transfer training, BADL training, compensatory strategies, AE, energy conservation, balance, family/caregiver training, safety awareness, and patient with good receptiveness. At the end of session, patient left sitting at EOB with needs met.    EDUCATION PROVIDED  Patient: Role of Occupational Therapy; Plan of Care; Discharge Recommendations; Adaptive Equipment Recommendations; DME Recommendations; Functional Transfer Techniques; Surgical Precautions; Weight Bear Status; Posture/Positioning; Compensatory ADL Techniques; Energy Conservation  Patient's Response to Education: Verbalized Understanding; Returned Demonstration  Family/Caregiver: Adaptive Equipment Recommendations; Role of Occupational Therapy; Plan of Care  Family/Caregiver's Response to Education: Verbalized Understanding; Returned Demonstration    The patient's Approx Degree of Impairment: 25.8% has been calculated based on documentation in the [facility] '6 clicks' Inpatient Daily Activity Short Form.  Research supports that patients with this level of impairment may benefit from intermittent assist as needed.  Final disposition will be made by interdisciplinary medical team.    Patient End of Session: Needs met;Call light within reach;RN aware of session/findings;All patient questions and concerns addressed;Family present    Patient was able to achieve the following ...   Patient able to toilet transfer  safely and independently    Patient able to dress lower extremities  at previous functional level    Patient/Caregiver able to demonstrate safety with ADLS  safely and independently     Patient Evaluation Complexity Level:   Occupational Profile/Medical History LOW - Brief history including review of medical or therapy records    Specific performance deficits impacting engagement in ADL/IADL LOW  1 - 3 performance deficits    Client Assessment/Performance Deficits LOW - No comorbidities nor modifications  of tasks    Clinical Decision Making LOW - Analysis of occupational profile, problem-focused assessments, limited treatment options    Overall Complexity LOW     OT Session Time: 25 minutes  Self-Care Home Management: 15 minutes  10 min yahir Pardo, OTR/L

## 2024-07-12 NOTE — ANESTHESIA PREPROCEDURE EVALUATION
Anesthesia PreOp Note    HPI:     Omid Knox is a 83 year old male who presents for preoperative consultation requested by: Zhao Trent MD    Date of Surgery: 7/12/2024    Procedure(s):  Right sacroiliac joint fusion  Indication: Sacrococcygeal disorders    Relevant Problems   No relevant active problems       NPO:  Last Liquid Consumption Date: 07/11/24  Last Liquid Consumption Time: 2030  Last Solid Consumption Date: 07/11/24  Last Solid Consumption Time: 2030  Last Liquid Consumption Date: 07/11/24          History Review:  Patient Active Problem List    Diagnosis Date Noted    Sacroiliitis (HCC) 07/09/2024    Atherosclerosis of native coronary artery without angina pectoris 07/09/2024    Shortness of breath 07/09/2024    Primary hypertension 07/09/2024    Cough 02/08/2020    Gastrointestinal hemorrhage 11/23/2019    Postprocedural hypotension 05/25/2019    Acute kidney failure (HCC) 05/25/2019    Allergic reaction caused by a drug 05/25/2019    Kyphoscoliosis 05/23/2019    Spinal stenosis of lumbar region 05/21/2019    Mild intermittent asthma without complication (HCC) 05/21/2019    History of kidney stones 05/21/2019    Essential hypertension 05/21/2019    Mixed hyperlipidemia 05/21/2019    BRAEDEN (obstructive sleep apnea) 05/21/2019    History of alcohol use 05/21/2019       Past Medical History:    Anesthesia complication    anxiety from  OR experience as a child    Back pain    Back problem    CAD (coronary artery disease)    Calculus of kidney    Essential hypertension    High blood pressure    High cholesterol    History of blood transfusion    many years ago    Hyperkalemia    Sleep apnea    no therapy    Visual impairment    readers       Past Surgical History:   Procedure Laterality Date    Arthroscopy of joint unlisted Bilateral     Colonoscopy      Egd  11/23/2019        Tonsillectomy      Total knee replacement Bilateral     right, left       Medications Prior to Admission    Medication Sig Dispense Refill Last Dose    Semaglutide (OZEMPIC, 1 MG/DOSE, SC) Inject 1 Dose into the skin once a week.   7/3/2024    lisinopril-hydroCHLOROthiazide 20-12.5 MG Oral Tab Take 1 tablet by mouth daily. 30 tablet 0 7/11/2024    aspirin 81 MG Oral Tab EC Take 1 tablet (81 mg total) by mouth daily.   7/6/2024    diclofenac (VOLTAREN) 1 % External Gel Apply 2 g topically 4 (four) times daily. 1 each 0     clotrimazole-betamethasone 1-0.05 % External Cream Apply to AA on the feet BID for 14 days 45 g 0     Glucosamine-Chondroit-Vit C-Mn (GLUCOSAMINE 1500 COMPLEX OR) Take by mouth.   Past Week    celecoxib 200 MG Oral Cap Take 1 capsule (200 mg total) by mouth daily.   7/6/2024    folic acid 800 MCG Oral Tab Take 1 tablet (800 mcg total) by mouth.   Past Week    atorvastatin 40 MG Oral Tab Take 1 tablet (40 mg total) by mouth daily.   7/11/2024    Multiple Vitamins-Minerals (CENTRUM SILVER) Oral Tab Take 1 tablet by mouth daily.   Past Week    cetirizine 10 MG Oral Tab Take 1 tablet (10 mg total) by mouth daily.   7/11/2024    acetaminophen 500 MG Oral Tab Take 2 tablets (1,000 mg total) by mouth daily.   7/11/2024     Current Facility-Administered Medications Ordered in Epic   Medication Dose Route Frequency Provider Last Rate Last Admin    lactated ringers infusion   Intravenous Continuous Zhao Trent MD 20 mL/hr at 07/12/24 0727 New Bag at 07/12/24 0727    clindamycin phosphate in NaCl 0.9% (Cleocin) 900 mg/50mL IVPB premix 900 mg  900 mg Intravenous Once Zhao Trent MD         No current Psychiatric-ordered outpatient medications on file.       Allergies   Allergen Reactions    Codeine HIVES    Eggplant HIVES    Hydrocodone HYPOTENSION    Oxycodone HIVES    Peanuts Tightness in Throat     Patient not sure if he has this allergy    Penicillins ANAPHYLAXIS     Per patient no organ involvement or skin blistering    Sulfa Antibiotics UNKNOWN     As a kid       Family History   Problem Relation Age of  Onset    Cancer Mother         stomach cancer age 52     Social History     Socioeconomic History    Marital status:    Tobacco Use    Smoking status: Former     Current packs/day: 0.00     Average packs/day: 1 pack/day for 30.0 years (30.0 ttl pk-yrs)     Types: Cigarettes     Start date: 1969     Quit date: 1999     Years since quittin.1     Passive exposure: Never    Smokeless tobacco: Never   Vaping Use    Vaping status: Never Used   Substance and Sexual Activity    Alcohol use: Yes     Alcohol/week: 2.0 - 3.0 standard drinks of alcohol     Types: 2 - 3 Standard drinks or equivalent per week     Comment: DAILY    Drug use: Not Currently       Available pre-op labs reviewed.  Lab Results   Component Value Date    WBC 4.9 2024    RBC 4.33 2024    HGB 14.0 2024    HCT 41.6 2024    MCV 96.1 2024    MCH 32.3 2024    MCHC 33.7 2024    RDW 12.2 2024    .0 2024     Lab Results   Component Value Date     2024    K 5.1 2024     2024    CO2 27.0 2024    BUN 42 (H) 2024    CREATSERUM 1.55 (H) 2024    GLU 98 2024    CA 9.7 2024          Vital Signs:  Body mass index is 39.77 kg/m².   height is 1.651 m (5' 5\") and weight is 108.4 kg (239 lb). His oral temperature is 98.1 °F (36.7 °C). His blood pressure is 130/73 and his pulse is 89. His respiration is 20 and oxygen saturation is 93%.   Vitals:    24 0949 24 0712   BP:  130/73   Pulse:  89   Resp:  20   Temp:  98.1 °F (36.7 °C)   TempSrc:  Oral   SpO2:  93%   Weight: 108.4 kg (239 lb)    Height: 1.651 m (5' 5\")         Anesthesia Evaluation     Patient summary reviewed and Nursing notes reviewed    No history of anesthetic complications   Airway   Mallampati: II  TM distance: <3 FB  Neck ROM: full  Dental    (+) upper dentures and partials    Pulmonary - normal exam   (+) asthma, sleep apnea  Cardiovascular - normal  exam  (+) hypertension, CAD    ROS comment: Stress 24 normal EKG and perfusion  EKG Inc RBBB  TTE 24 EF 50-55%, mild aortic stenosis    Neuro/Psych - negative ROS     GI/Hepatic/Renal    (+) chronic renal disease    Endo/Other - negative ROS   Abdominal                  Anesthesia Plan:   ASA:  3  Plan:   General  Airway:  ETT and Video laryngoscope  Post-op Pain Management: IV analgesics and Local  Plan Comments: I have discussed the anesthetic plan, major risks and alternatives with the patient and answered all questions. The patient desires to proceed with surgery and anesthesia as planned.     Informed Consent Plan and Risks Discussed With:  Patient      I have informed Omid Knox and/or legal guardian or family member of the nature of the anesthetic plan, benefits, risks including possible dental damage if relevant, major complications, and any alternative forms of anesthetic management.   All of the patient's questions were answered to the best of my ability. The patient desires the anesthetic management as planned.  Adam Worley MD  7/12/2024 7:49 AM  Present on Admission:   Spinal stenosis of lumbar region   Mild intermittent asthma without complication (HCC)   History of kidney stones   Essential hypertension   Mixed hyperlipidemia   BRAEDEN (obstructive sleep apnea)   Sacroiliitis (HCC)   Primary hypertension

## 2024-07-12 NOTE — H&P
[facility]  part of [facility]    History & Physical    Omid Knox Patient Status:  Outpatient in a Bed    1940 MRN [id number]   Location [facility] PRE OP RECOVERY Attending Zhao Trent MD   Hosp Day # 0 PCP No primary care provider on file.     Date:  2024  Date of Admission:  2024    History:  Past Medical History:    Anesthesia complication    anxiety from  OR experience as a child    Back pain    Back problem    CAD (coronary artery disease)    Calculus of kidney    Essential hypertension    High blood pressure    High cholesterol    History of blood transfusion    many years ago    Hyperkalemia    Sleep apnea    no therapy    Visual impairment    readers     Past Surgical History:   Procedure Laterality Date    Arthroscopy of joint unlisted Bilateral     Colonoscopy      Egd  2019        Tonsillectomy      Total knee replacement Bilateral     right, left     Family History   Problem Relation Age of Onset    Cancer Mother         stomach cancer age 52      reports that he quit smoking about 25 years ago. His smoking use included cigarettes. He started smoking about 55 years ago. He has a 30 pack-year smoking history. He has never been exposed to tobacco smoke. He has never used smokeless tobacco. He reports current alcohol use of about 2.0 - 3.0 standard drinks of alcohol per week. He reports that he does not currently use drugs.    Allergies:  Allergies   Allergen Reactions    Codeine HIVES    Eggplant HIVES    Hydrocodone HYPOTENSION    Oxycodone HIVES    Peanuts Tightness in Throat     Patient not sure if he has this allergy    Penicillins ANAPHYLAXIS     Per patient no organ involvement or skin blistering    Sulfa Antibiotics UNKNOWN     As a kid       Home Medications:  Medications Prior to Admission   Medication Sig Dispense Refill Last Dose    Semaglutide (OZEMPIC, 1 MG/DOSE, SC) Inject 1 Dose into the skin once a week.   7/3/2024     lisinopril-hydroCHLOROthiazide 20-12.5 MG Oral Tab Take 1 tablet by mouth daily. 30 tablet 0 7/11/2024    aspirin 81 MG Oral Tab EC Take 1 tablet (81 mg total) by mouth daily.   7/6/2024    diclofenac (VOLTAREN) 1 % External Gel Apply 2 g topically 4 (four) times daily. 1 each 0     clotrimazole-betamethasone 1-0.05 % External Cream Apply to AA on the feet BID for 14 days 45 g 0     Glucosamine-Chondroit-Vit C-Mn (GLUCOSAMINE 1500 COMPLEX OR) Take by mouth.   Past Week    celecoxib 200 MG Oral Cap Take 1 capsule (200 mg total) by mouth daily.   7/6/2024    folic acid 800 MCG Oral Tab Take 1 tablet (800 mcg total) by mouth.   Past Week    atorvastatin 40 MG Oral Tab Take 1 tablet (40 mg total) by mouth daily.   7/11/2024    Multiple Vitamins-Minerals (CENTRUM SILVER) Oral Tab Take 1 tablet by mouth daily.   Past Week    cetirizine 10 MG Oral Tab Take 1 tablet (10 mg total) by mouth daily.   7/11/2024    acetaminophen 500 MG Oral Tab Take 2 tablets (1,000 mg total) by mouth daily.   7/11/2024       Review of Systems:  12 point ROS reviewed without pertinent positives.     HPI: The patient presents with complaints of right lumbar pain. The pain does not radiate from the low back.   He is S/P left SI fusion earlier this Spring.   They deny current fevers, chills, infection, rashes/bruises on the body, gross bowel/bladder incontinence or saddle anesthesia.     Physical Exam:  The patient is well developed, no acute distress, alert and oriented x3, skin intact to the posterior lumbar without erythema or edema, motor exam with 5/5 bilateral lower extremities, calves soft and non tender, 2+DP, prior lumbar surgical scars well healed    Assessment:  Patient Active Problem List   Diagnosis    Spinal stenosis of lumbar region    Mild intermittent asthma without complication (HCC)    History of kidney stones    Essential hypertension    Mixed hyperlipidemia    BRAEDEN (obstructive sleep apnea)    History of alcohol use     Kyphoscoliosis    Postprocedural hypotension    Acute kidney failure (HCC)    Allergic reaction caused by a drug    Gastrointestinal hemorrhage    Cough    Sacroiliitis (HCC)    Atherosclerosis of native coronary artery without angina pectoris    Shortness of breath    Primary hypertension     Right sacroiliac arthropathy and pain    Plan:  Right SI fusion      Jennie Swenson PA-C  7/12/2024  9:05 AM

## 2024-07-12 NOTE — PHYSICAL THERAPY NOTE
PHYSICAL THERAPY EVALUATION - INPATIENT     [facility]  Evaluation Date: 7/12/2024  Type of Evaluation: Initial   Physician Order: PT Eval and Treat    Presenting Problem: Sacrococcygeal disorder, s/p Right sacroiliac joint fusion     Reason for Therapy: Mobility Dysfunction and Discharge Planning    PHYSICAL THERAPY ASSESSMENT   Patient is a 83 year old male admitted 7/12/2024 for Sacrococcygeal disorder, s/p Right sacroiliac joint fusion. Prior to admission, patient's baseline is Independent with ADLs/iADLs, Mod I > Independent for functional mobility with intermittent use of RW. Patient is currently functioning near baseline with bed mobility, transfers, gait, stair negotiation, and standing prolonged periods. Based on patient and family demonstrating good understanding of post operative protocol and safe functional mobility observed this date, no further skilled IP PT required at this time. RN made aware. Patient and family agreeable. Safe for DC to home from a PT standpoint.     PLAN  Patient has been evaluated and presents with no skilled Physical Therapy needs at this time.  Patient will be discharged from Physical Therapy services. Please re-order if a new functional limitation presents during this admission.    PHYSICAL THERAPY MEDICAL/SOCIAL HISTORY     Problem List  Principal Problem:    Sacroiliitis (HCC)  Active Problems:    Spinal stenosis of lumbar region    Mild intermittent asthma without complication (HCC)    History of kidney stones    Essential hypertension    Mixed hyperlipidemia    BRAEDEN (obstructive sleep apnea)    Atherosclerosis of native coronary artery without angina pectoris    Shortness of breath    Primary hypertension    History of spinal fusion      HOME SITUATION  Home Situation  Type of Home: House  Home Layout: One level  Stairs to Enter : 2  Railing: Yes  Lives With: Spouse  Drives: Yes  Patient Owned Equipment: Rolling walker;Rollator     SUBJECTIVE  \"I had lunch  plans scheduled for 1 PM\"    PHYSICAL THERAPY EXAMINATION   OBJECTIVE  Precautions: Spine  Fall Risk: Standard fall risk    WEIGHT BEARING RESTRICTION  Weight Bearing Restriction: R lower extremity  R Lower Extremity:  (Start with toe touch as tolerate and advance to weight bearing as tolerated. Confirmed with surgical PA that patient can advance to WBAT with RW right away if pain is managed.)    PAIN ASSESSMENT  Ratin  Location: right buttocks  Management Techniques: Activity promotion;Body mechanics;Repositioning    COGNITION  Overall Cognitive Status:  WFL - within functional limits    RANGE OF MOTION AND STRENGTH ASSESSMENT  Upper extremity ROM and strength are within functional limits   Lower extremity ROM is within functional limits   Lower extremity strength is within functional limits     BALANCE  Static Sitting: Good  Dynamic Sitting: Fair +  Static Standing: Fair  Dynamic Standing: Fair -    ACTIVITY TOLERANCE  BP: 141/87  BP Location: Right arm  BP Method: Automatic  Patient Position: Lying    AM-PAC '6-Clicks' INPATIENT SHORT FORM - BASIC MOBILITY  How much difficulty does the patient currently have...  Patient Difficulty: Turning over in bed (including adjusting bedclothes, sheets and blankets)?: A Little   Patient Difficulty: Sitting down on and standing up from a chair with arms (e.g., wheelchair, bedside commode, etc.): A Little   Patient Difficulty: Moving from lying on back to sitting on the side of the bed?: A Little   How much help from another person does the patient currently need...   Help from Another: Moving to and from a bed to a chair (including a wheelchair)?: A Little   Help from Another: Need to walk in hospital room?: A Little   Help from Another: Climbing 3-5 steps with a railing?: A Little     AM-PAC Score:  Raw Score: 18   Approx Degree of Impairment: 46.58%   Standardized Score (AM-PAC Scale): 43.63   CMS Modifier (G-Code): CK    FUNCTIONAL ABILITY STATUS  Functional  Mobility/Gait Assessment  Gait Assistance:  (SBA progressing to Supervision as distance increased)  Distance (ft): 200 ft  Assistive Device: Rolling walker  Pattern: R Decreased stance time;Shuffle (decreased americo speed)  Stairs: Stairs  How Many Stairs: 4  Device: 2 Rails  Assist: Contact guard assist  Pattern: Ascend and Descend  Ascend and Descend : Step to  Rolling: contact guard assist via log roll technique  Supine to Sit: contact guard assist via log roll technique; cues provided for appropriate sequencing  Sit to Stand: contact guard assist progressing to supervision with increased trials    Exercise/Education Provided:  Bed mobility  Body mechanics  Energy conservation  Functional activity tolerated  Gait training  ROM  Transfer training  Stair negotiation    Skilled Therapy Provided: Patient with spinal precautions.  Educated patient on post-op spinal precautions, log rolling, progressive mobility, and frequent position changes. Patient with good carryover throughout. Patient quick moving throughout session - cues provided to decrease speed for safety purposes.    The patient's Approx Degree of Impairment: 46.58% has been calculated based on documentation in the [facility] '6 clicks' Inpatient Basic Mobility Short Form.  Research supports that patients with this level of impairment may benefit from HHPT, however, patient will have assistance upon returning home. Recommend home with increased support.  Final disposition will be made by interdisciplinary medical team.    Patient in bed with wife present in room upon arrival. RN approved activity. Educated patient on POC and benefits of mobilization. Agreeable to participate. Patient reporting right buttocks pain, rated 4 out of 10 per the pain scale.   Patient End of Session: Needs met;Call light within reach;RN aware of session/findings;All patient questions and concerns addressed;Family present (sitting EOB)    Patient was able to achieve the following ...    Patient able to transfer   Supervision, safe for home    Patient able to ambulate on level surfaces   Supervision, safe for home     Patient Evaluation Complexity Level:  History Low - no personal factors and/or co-morbidities   Examination of body systems Low -  addressing 1-2 elements   Clinical Presentation Low- Stable   Clinical Decision Making  Low Complexity     Gait Trainin minutes

## 2024-07-16 ENCOUNTER — TELEPHONE (OUTPATIENT)
Dept: INTERNAL MEDICINE UNIT | Facility: HOSPITAL | Age: 84
End: 2024-07-16

## 2024-07-16 NOTE — TELEPHONE ENCOUNTER
Patient had surgery last week with Dr. Trent.  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  He has had gout in toe in the past.  Will order colchicine for patient to take. (Called into pharmacy) He will call if medication does not stop discomfort.

## 2024-07-18 NOTE — OPERATIVE REPORT
PATIENT  Omid Knox    DATE OF SURGERY  7/18/24    SURGEON   Zhao Trent MD    ASSISTANT  BARRETT Pruitt    PREOPERATIVE DIAGNOSIS   Right SI joint fusion    POSTOPERATIVE DIAGNOSIS   Same    PROCEDURES   SI joint fusion right sided      ANESTHESIA   General endotracheal    COMPLICATIONS   None.     BLOOD LOSS   Minimal.     INDICATIONS   The patient is a 83 year-old male with prior lumbar fusion. Had subsequent SI joint pain. Has had non operative treatment which failed. The patient consented to the decompression procedure. The patient also understood the risks of anesthesia which include possible stroke, MI, death.       TECHNIQUE   The patient was brought to the operating room. General   anesthesia with endotracheal intubation was performed. The patient was positioned prone on the Zoltan spinal frame. Care was taken to ensure bony prominences   were well padded. The lower back was prepped and draped in the   Usual sterile fashion. A surgical timeout was performed according to the WHO standards identifying the appropriate patient, surgical site, and surgical procedure.   Under fluoroscopy, the operative levels were identified.       An incision just lateral to the SI joint was made. A pin was placed in a medial, anterior direction across the SI joint. This was checked under fluoroscopy. The pin was then drilled and a final implant was placed. The identical procedure was performed just lateral to the S1 foramen.  Bone graft, allograft was used for fusion in the SI joint fusion devices.     The wound was thoroughly irrigated and hemostasis was ensured.   Sterile solution was infiltrated.   Meticulous hemostasis was achieved.   Fascia was approximated with 0 Vicryl suture. Subcutaneous tissue   and skin was approximated with suture. Sterile dressing applied.   The patient returned to recovery in stable condition.     I was present for and performed the entire procedure.

## 2024-11-08 ENCOUNTER — HOSPITAL ENCOUNTER (OUTPATIENT)
Facility: HOSPITAL | Age: 84
Setting detail: OBSERVATION
Discharge: HOME | End: 2024-11-09
Attending: EMERGENCY MEDICINE | Admitting: HOSPITALIST
Payer: MEDICARE

## 2024-11-08 ENCOUNTER — APPOINTMENT (OUTPATIENT)
Dept: GENERAL RADIOLOGY | Facility: HOSPITAL | Age: 84
End: 2024-11-08
Attending: EMERGENCY MEDICINE
Payer: MEDICARE

## 2024-11-08 ENCOUNTER — APPOINTMENT (OUTPATIENT)
Dept: CT IMAGING | Facility: HOSPITAL | Age: 84
End: 2024-11-08
Attending: EMERGENCY MEDICINE
Payer: MEDICARE

## 2024-11-08 DIAGNOSIS — I48.91 ATRIAL FIBRILLATION AND FLUTTER (HCC): Primary | ICD-10-CM

## 2024-11-08 DIAGNOSIS — E83.42 HYPOMAGNESEMIA: ICD-10-CM

## 2024-11-08 DIAGNOSIS — I48.92 ATRIAL FIBRILLATION AND FLUTTER (HCC): Primary | ICD-10-CM

## 2024-11-08 LAB
ANION GAP SERPL CALC-SCNC: 11 MMOL/L (ref 0–18)
BASOPHILS # BLD AUTO: 0.02 X10(3) UL (ref 0–0.2)
BASOPHILS NFR BLD AUTO: 0.3 %
BUN BLD-MCNC: 21 MG/DL (ref 9–23)
BUN/CREAT SERPL: 17.5 (ref 10–20)
CALCIUM BLD-MCNC: 10 MG/DL (ref 8.7–10.4)
CHLORIDE SERPL-SCNC: 107 MMOL/L (ref 98–112)
CO2 SERPL-SCNC: 23 MMOL/L (ref 21–32)
CREAT BLD-MCNC: 1.2 MG/DL
DEPRECATED RDW RBC AUTO: 47.5 FL (ref 35.1–46.3)
EGFRCR SERPLBLD CKD-EPI 2021: 60 ML/MIN/1.73M2 (ref 60–?)
EOSINOPHIL # BLD AUTO: 0.36 X10(3) UL (ref 0–0.7)
EOSINOPHIL NFR BLD AUTO: 6 %
ERYTHROCYTE [DISTWIDTH] IN BLOOD BY AUTOMATED COUNT: 13.2 % (ref 11–15)
GLUCOSE BLD-MCNC: 125 MG/DL (ref 70–99)
GLUCOSE BLDC GLUCOMTR-MCNC: 139 MG/DL (ref 70–99)
HCT VFR BLD AUTO: 42.9 %
HGB BLD-MCNC: 14.5 G/DL
IMM GRANULOCYTES # BLD AUTO: 0.02 X10(3) UL (ref 0–1)
IMM GRANULOCYTES NFR BLD: 0.3 %
LYMPHOCYTES # BLD AUTO: 1.43 X10(3) UL (ref 1–4)
LYMPHOCYTES NFR BLD AUTO: 23.8 %
MAGNESIUM SERPL-MCNC: 1.5 MG/DL (ref 1.6–2.6)
MCH RBC QN AUTO: 32.4 PG (ref 26–34)
MCHC RBC AUTO-ENTMCNC: 33.8 G/DL (ref 31–37)
MCV RBC AUTO: 96 FL
MONOCYTES # BLD AUTO: 0.73 X10(3) UL (ref 0.1–1)
MONOCYTES NFR BLD AUTO: 12.1 %
NEUTROPHILS # BLD AUTO: 3.46 X10 (3) UL (ref 1.5–7.7)
NEUTROPHILS # BLD AUTO: 3.46 X10(3) UL (ref 1.5–7.7)
NEUTROPHILS NFR BLD AUTO: 57.5 %
OSMOLALITY SERPL CALC.SUM OF ELEC: 296 MOSM/KG (ref 275–295)
PLATELET # BLD AUTO: 197 10(3)UL (ref 150–450)
POTASSIUM SERPL-SCNC: 4.2 MMOL/L (ref 3.5–5.1)
Q-T INTERVAL: 324 MS
Q-T INTERVAL: 362 MS
QRS DURATION: 118 MS
QRS DURATION: 122 MS
QTC CALCULATION (BEZET): 496 MS
QTC CALCULATION (BEZET): 501 MS
R AXIS: -46 DEGREES
R AXIS: -49 DEGREES
RBC # BLD AUTO: 4.47 X10(6)UL
SODIUM SERPL-SCNC: 141 MMOL/L (ref 136–145)
T AXIS: 29 DEGREES
T AXIS: 45 DEGREES
TROPONIN I SERPL HS-MCNC: 11 NG/L
TROPONIN I SERPL HS-MCNC: 12 NG/L
TSI SER-ACNC: 2.15 UIU/ML (ref 0.55–4.78)
VENTRICULAR RATE: 113 BPM
VENTRICULAR RATE: 144 BPM
WBC # BLD AUTO: 6 X10(3) UL (ref 4–11)

## 2024-11-08 PROCEDURE — 71260 CT THORAX DX C+: CPT | Performed by: EMERGENCY MEDICINE

## 2024-11-08 PROCEDURE — 99223 1ST HOSP IP/OBS HIGH 75: CPT | Performed by: HOSPITALIST

## 2024-11-08 PROCEDURE — 71045 X-RAY EXAM CHEST 1 VIEW: CPT | Performed by: EMERGENCY MEDICINE

## 2024-11-08 RX ORDER — ACETAMINOPHEN 500 MG
500 TABLET ORAL EVERY 4 HOURS PRN
Status: DISCONTINUED | OUTPATIENT
Start: 2024-11-08 | End: 2024-11-09

## 2024-11-08 RX ORDER — MAGNESIUM SULFATE HEPTAHYDRATE 40 MG/ML
2 INJECTION, SOLUTION INTRAVENOUS ONCE
Status: COMPLETED | OUTPATIENT
Start: 2024-11-08 | End: 2024-11-08

## 2024-11-08 RX ORDER — FUROSEMIDE 20 MG/1
20 TABLET ORAL DAILY
COMMUNITY

## 2024-11-08 RX ORDER — DILTIAZEM HYDROCHLORIDE 360 MG/1
360 CAPSULE, EXTENDED RELEASE ORAL DAILY
COMMUNITY

## 2024-11-08 RX ORDER — ALLOPURINOL 100 MG/1
50 TABLET ORAL DAILY
COMMUNITY

## 2024-11-08 RX ORDER — DILTIAZEM HYDROCHLORIDE 5 MG/ML
10 INJECTION INTRAVENOUS ONCE
Status: COMPLETED | OUTPATIENT
Start: 2024-11-08 | End: 2024-11-08

## 2024-11-08 RX ORDER — METOCLOPRAMIDE HYDROCHLORIDE 5 MG/ML
2.5 INJECTION INTRAMUSCULAR; INTRAVENOUS EVERY 8 HOURS PRN
Status: DISCONTINUED | OUTPATIENT
Start: 2024-11-08 | End: 2024-11-09

## 2024-11-08 RX ORDER — METOPROLOL TARTRATE 25 MG/1
25 TABLET, FILM COATED ORAL
Status: DISCONTINUED | OUTPATIENT
Start: 2024-11-08 | End: 2024-11-09

## 2024-11-08 RX ORDER — ONDANSETRON 2 MG/ML
4 INJECTION INTRAMUSCULAR; INTRAVENOUS EVERY 6 HOURS PRN
Status: DISCONTINUED | OUTPATIENT
Start: 2024-11-08 | End: 2024-11-09

## 2024-11-08 RX ORDER — FLUTICASONE PROPIONATE 50 MCG
2 SPRAY, SUSPENSION (ML) NASAL DAILY
COMMUNITY

## 2024-11-08 RX ORDER — MULTIVIT-MIN/IRON FUM/FOLIC AC 7.5 MG-4
1 TABLET ORAL DAILY
COMMUNITY

## 2024-11-08 RX ORDER — COLCHICINE 0.6 MG/1
0.3 TABLET ORAL DAILY
COMMUNITY
End: 2024-11-09

## 2024-11-08 NOTE — CONSULTS
[facility]  part of [facility]    Cardiology Consultation    Omid Knox Patient Status:  Emergency    1940 MRN [id number]   Location [facility] EMERGENCY DEPARTMENT Attending Medardo Lazaro MD   Hosp Day # 0 PCP PHYSICIAN NONSTAFF     Date of Admission:  2024  Date of Consult:  2024  Reason for Consultation:     History of Present Illness:   Patient is a 84 year old male with sig PMH/o CAD, hypertension, BRAEDEN, PE on apixaban, tobacco use who presents with dyspnea, dizziness. In the ED, found to be tachycardic, rates in the 140s. ECG and rhythm strips with AF with RVR.   Given IV diltiazem 10mg x 1 with restoration of SR.   Dyspnea and dizziness have since resolved.   Denies any CP. No palpitations.     Of note, pt had R SI joint fusion surgery on 24 and in the post-op period had sinus tachycardia - found to have elevated d-dimer, underwent CTA of the chest on 24 that revealed pulmonary emboli (left upper, right middle and right lower lobe). Pt was started on apixaban 5mg BID. Has been adherent.   Assessment and Plan:   Pulmonary emboli - on apixaban   CAD  HTN  BRAEDEN    New onset AF with RVR  -presented with dizziness, dyspnea  -initial rates in the 140s  -spontaneous restoration of SR following IV dilt 10mg x 1  -rule out infectious etiology, has history of recent multiple segmental PE on apixaban  -obtain TTE, TSH  -needs strict adherent to CPAP given underlying BRAEDEN  -start rate control with PO metoprlol 25mg BID  -continue apixaban 5mg BID    Past Medical History  Past Medical History:    Anesthesia complication    anxiety from  OR experience as a child    Back pain    Back problem    CAD (coronary artery disease)    Calculus of kidney    Essential hypertension    High blood pressure    High cholesterol    History of blood transfusion    many years ago    Hyperkalemia    Sleep apnea    no therapy    Visual impairment    readers       Past  Surgical History  Past Surgical History:   Procedure Laterality Date    Arthroscopy of joint unlisted Bilateral     Colonoscopy      Egd  11/23/2019        Tonsillectomy      Total knee replacement Bilateral     right, left       Family History  Family History   Problem Relation Age of Onset    Cancer Mother         stomach cancer age 52       Social History  Pediatric History   Patient Parents    Not on file     Other Topics Concern    Not on file   Social History Narrative    Not on file           Current Medications:  No current facility-administered medications for this encounter.     (Not in a hospital admission)      Allergies  Allergies[1]    Review of Systems:   ROS  10 point review of systems completed and negative except as noted.    Physical Exam:   Patient Vitals for the past 24 hrs:   BP Temp Temp src Pulse Resp SpO2   11/08/24 1645 108/74 -- -- 73 19 93 %   11/08/24 1515 103/84 -- -- (!) 135 25 90 %   11/08/24 1450 -- 98.3 °F (36.8 °C) Oral (!) 143 26 96 %       Intake/Output:   Last 3 shifts:   Vent Settings:    Hemodynamic parameters (last 24 hours):    Scheduled Meds:   Continuous Infusions:     Physical Exam:  General: Alert and oriented x 3. No apparent distress.   HEENT: Normocephalic, anicteric sclera, neck supple, no thyromegaly or adenopathy.  Neck: No JVD, carotids 2+, no bruits.  Cardiac: Regular rate and rhythm. S1, S2 normal.   Lungs: diminished BS, +wheezes  Abdomen: Soft, non-tender. No organosplenomegally, mass or rebound, BS-present.  Extremities: Without clubbing or cyanosis. +BLE edema.    Neurologic: Alert and oriented, normal affect. No focal defects  Skin: Warm and dry.     Results:   Laboratory Data:  Lab Results   Component Value Date    WBC 6.0 11/08/2024    HGB 14.5 11/08/2024    HCT 42.9 11/08/2024    .0 11/08/2024    CREATSERUM 1.20 11/08/2024    BUN 21 11/08/2024     11/08/2024    K 4.2 11/08/2024     11/08/2024    CO2 23.0 11/08/2024     (H)  11/08/2024    CA 10.0 11/08/2024    ALB 4.3 06/18/2024    ALKPHO 69 06/18/2024    TP 6.7 06/18/2024    AST 29 06/18/2024    ALT 30 06/18/2024    PTT 28.5 06/18/2024    INR 1.05 06/18/2024    PTP 14.3 06/18/2024    TSH 1.580 05/29/2019    DDIMER 0.62 09/29/2023    TROP <0.045 05/25/2019         Recent Labs   Lab 11/08/24  1452   *   BUN 21   CREATSERUM 1.20   CA 10.0      K 4.2      CO2 23.0     Recent Labs   Lab 11/08/24  1452   RBC 4.47   HGB 14.5   HCT 42.9   MCV 96.0   MCH 32.4   MCHC 33.8   RDW 13.2   NEPRELIM 3.46   WBC 6.0   .0       No results for input(s): \"BNPML\" in the last 168 hours.    No results for input(s): \"TROP\", \"CK\" in the last 168 hours.    Imaging:  No results found.    Thank you for allowing me to participate in the care of your patient.    Sai Irving, [facility] [place]  11/8/2024         [1]   Allergies  Allergen Reactions    Codeine HIVES    Eggplant HIVES    Hydrocodone HYPOTENSION    Oxycodone HIVES    Peanuts Tightness in Throat     Patient not sure if he has this allergy    Penicillins ANAPHYLAXIS     Per patient no organ involvement or skin blistering    Sulfa Antibiotics UNKNOWN     As a kid

## 2024-11-08 NOTE — ED INITIAL ASSESSMENT (HPI)
Pt presents to ED with wife for dizziness and hypotension. Pt states he was going out with his friend and he started getting dizzy. Per wife pt's  BP was in the 60s. Pt also c/o of SOB.

## 2024-11-08 NOTE — ED PROVIDER NOTES
Patient Seen in: [facility] Emergency Department      History     Chief Complaint   Patient presents with    Hypotension     Stated Complaint: Hypotension, Shortness of Breath    Subjective:   85 y/o states he was fine this morning and when he got home around 1:15 PM he started having lightheaded episodes .  He never had any chest pain or palpitation.  His heart rate is intermittently changing as I am speaking to him and he is not feeling the changes.  Denies cough or fever.  No focal weakness or numbness.  No nausea vomiting or diarrhea.  No cough or congestion.              Objective:     No pertinent past medical history.            No pertinent past surgical history.              No pertinent social history.                Physical Exam     ED Triage Vitals   BP 11/08/24 1515 103/84   Pulse 11/08/24 1450 (!) 143   Resp 11/08/24 1450 26   Temp 11/08/24 1450 98.3 °F (36.8 °C)   Temp src 11/08/24 1450 Oral   SpO2 11/08/24 1450 96 %   O2 Device 11/08/24 1450 None (Room air)       Current Vitals:   Vital Signs  BP: 127/68  Pulse: 63  Resp: 21  Temp: 98.3 °F (36.8 °C)  Temp src: Oral  MAP (mmHg): 81    Oxygen Therapy  SpO2: 92 %  O2 Device: None (Room air)        Physical Exam  Constitutional:       Appearance: He is obese.   HENT:      Right Ear: External ear normal.      Left Ear: External ear normal.      Nose: Nose normal.   Eyes:      Extraocular Movements: Extraocular movements intact.      Pupils: Pupils are equal, round, and reactive to light.   Cardiovascular:      Rate and Rhythm: Regular rhythm. Tachycardia present.      Pulses: Normal pulses.   Pulmonary:      Effort: Pulmonary effort is normal.      Breath sounds: Normal breath sounds.   Abdominal:      Palpations: Abdomen is soft.      Tenderness: There is no abdominal tenderness.   Musculoskeletal:         General: Normal range of motion.      Cervical back: Normal range of motion.   Skin:     Capillary Refill: Capillary refill takes less than  2 seconds.   Neurological:      Mental Status: He is alert.      Sensory: No sensory deficit.      Motor: No weakness.             ED Course     Labs Reviewed   CBC WITH DIFFERENTIAL WITH PLATELET - Abnormal; Notable for the following components:       Result Value    RDW-SD 47.5 (*)     All other components within normal limits   BASIC METABOLIC PANEL (8) - Abnormal; Notable for the following components:    Glucose 125 (*)     Calculated Osmolality 296 (*)     All other components within normal limits   MAGNESIUM - Abnormal; Notable for the following components:    Magnesium 1.5 (*)     All other components within normal limits   POCT GLUCOSE - Abnormal; Notable for the following components:    POC Glucose  139 (*)     All other components within normal limits   TROPONIN I HIGH SENSITIVITY - Normal   TROPONIN I HIGH SENSITIVITY - Normal   TSH W REFLEX TO FREE T4 - Normal   RAINBOW DRAW LAVENDER   RAINBOW DRAW LIGHT GREEN   RAINBOW DRAW BLUE   RAINBOW DRAW GOLD     EKG    Rate, intervals and axes as noted on EKG Report.  Rate: 144  Rhythm: Regular rhythm flutter is probable  Reading: Incomplete right bundle branch block.  No ST elevation.  Some suggestion of depression.  Compared to an EKG dated June 2024 where the patient did have the incomplete right bundle branch block.  Had PACs but was in sinus rhythm.  Overall the QRS looks similar but some of the ST segments are different    A second EKG was done by myself which showed the patient to have a rate of 113 and he is in 2 different rhythms.  Likely the beginning is sinus and he goes into brief flutter.  Incomplete right bundle branch block seen.  QTc 496.  Both EKGs were abnormal         ED Course as of 11/08/24 1921  ------------------------------------------------------------  Time: 11/08 1554  Comment: Labs independently interpreted by me.  CBC and BMP normal.  ------------------------------------------------------------  Time: 11/08 0955  Comment: Magnesium is  low and will be replaced.  I ordered magnesium.  Troponin normal, TSH normal  ------------------------------------------------------------  Time: 11/08 1835  Comment: Chest x-ray independently interpreted by me stable cardiomegaly.  Awaiting CT report before admitting patient to the floor.  Discussed with cardiology.  Patient was seen by Dr. Ward in the emergency department.  ------------------------------------------------------------  Time: 11/08 1919  Comment: CT chest independently interpreted by me shows no PE.  Patient will be admitted.  Seen by cardiology.  Heart rate controlled              MDM      CT CHEST PE AORTA (IV ONLY) (CPT=71260)    Result Date: 11/8/2024  CONCLUSION: No CTA evidence of acute abnormality within the chest.  Nonacute findings are present and are described within the body of the report    Dictated by (CST): Larry Valle MD on 11/08/2024 at 6:48 PM     Finalized by (CST): Larry Valle MD on 11/08/2024 at 6:58 PM          XR CHEST AP PORTABLE  (CPT=71045)    Result Date: 11/8/2024  CONCLUSION:  1. Stable cardiomegaly.  No acute cardiopulmonary finding.    Dictated by (CST): Brigido De Leon MD on 11/08/2024 at 5:07 PM     Finalized by (CST): Brigido De Leon MD on 11/08/2024 at 5:08 PM             Admission disposition: 11/8/2024  7:20 PM           Medical Decision Making  Patient here with lightheadedness and having runs of tachycardia.  Differential could include but is not limited to PE, cardiac arrhythmia such as atrial fibrillation, GI bleed, sepsis and many other possibilities.  Patient said he was just put back on Eliquis a couple weeks ago by his primary.  He said he has been compliant with it for 2 weeks    Amount and/or Complexity of Data Reviewed  External Data Reviewed: notes.     Details: I reviewed discharge summary from July.  Patient presented to  another  hospital with lightheadedness and some intermittent tachycardia and ended up having bilateral PEs.  Discharge  summary said he was post to be sent home on EliEmbedded Chat.  Labs: ordered. Decision-making details documented in ED Course.  Radiology: ordered and independent interpretation performed. Decision-making details documented in ED Course.  ECG/medicine tests: ordered and independent interpretation performed. Decision-making details documented in ED Course.  Discussion of management or test interpretation with external provider(s): Patient given Cardizem.  Rate controlled.  Seen by cardiology.  Magnesium given.  CT did not show PE.  Patient will remain on Eliquis.  Will admit to the hospital.    Risk  Decision regarding hospitalization.  Risk Details: PE, hypertension, hyperlipidemia        Disposition and Plan     Clinical Impression:  1. Atrial fibrillation and flutter (HCC)    2. Hypomagnesemia         Disposition:  Admit  11/8/2024  7:20 pm    Follow-up:  No follow-up provider specified.  We recommend that you schedule follow up care with a primary care provider within the next three months to obtain basic health screening including reassessment of your blood pressure.      Medications Prescribed:  Current Discharge Medication List              Supplementary Documentation:         Hospital Problems       Present on Admission  Date Reviewed: 7/12/2024            ICD-10-CM Noted POA    * (Principal) Atrial fibrillation and flutter (HCC) I48.91, I48.92 11/8/2024 Unknown

## 2024-11-09 ENCOUNTER — APPOINTMENT (OUTPATIENT)
Dept: CV DIAGNOSTICS | Facility: HOSPITAL | Age: 84
End: 2024-11-09
Attending: HOSPITALIST
Payer: MEDICARE

## 2024-11-09 VITALS
HEART RATE: 75 BPM | TEMPERATURE: 98 F | WEIGHT: 223.38 LBS | RESPIRATION RATE: 18 BRPM | OXYGEN SATURATION: 92 % | SYSTOLIC BLOOD PRESSURE: 116 MMHG | BODY MASS INDEX: 37.22 KG/M2 | HEIGHT: 65 IN | DIASTOLIC BLOOD PRESSURE: 68 MMHG

## 2024-11-09 PROBLEM — E83.42 HYPOMAGNESEMIA: Status: RESOLVED | Noted: 2024-11-08 | Resolved: 2024-11-09

## 2024-11-09 PROBLEM — I48.92 ATRIAL FLUTTER (HCC): Status: RESOLVED | Noted: 2024-11-08 | Resolved: 2024-11-09

## 2024-11-09 PROBLEM — I48.91 ATRIAL FIBRILLATION AND FLUTTER (HCC): Status: RESOLVED | Noted: 2024-11-08 | Resolved: 2024-11-09

## 2024-11-09 PROBLEM — I48.92 ATRIAL FIBRILLATION AND FLUTTER (HCC): Status: RESOLVED | Noted: 2024-11-08 | Resolved: 2024-11-09

## 2024-11-09 LAB
ANION GAP SERPL CALC-SCNC: 5 MMOL/L (ref 0–18)
BASOPHILS # BLD AUTO: 0.02 X10(3) UL (ref 0–0.2)
BASOPHILS NFR BLD AUTO: 0.5 %
BUN BLD-MCNC: 20 MG/DL (ref 9–23)
BUN/CREAT SERPL: 20 (ref 10–20)
CALCIUM BLD-MCNC: 10 MG/DL (ref 8.7–10.4)
CHLORIDE SERPL-SCNC: 106 MMOL/L (ref 98–112)
CO2 SERPL-SCNC: 31 MMOL/L (ref 21–32)
CREAT BLD-MCNC: 1 MG/DL
DEPRECATED RDW RBC AUTO: 47.6 FL (ref 35.1–46.3)
EGFRCR SERPLBLD CKD-EPI 2021: 74 ML/MIN/1.73M2 (ref 60–?)
EOSINOPHIL # BLD AUTO: 0.41 X10(3) UL (ref 0–0.7)
EOSINOPHIL NFR BLD AUTO: 9.5 %
ERYTHROCYTE [DISTWIDTH] IN BLOOD BY AUTOMATED COUNT: 13.3 % (ref 11–15)
GLUCOSE BLD-MCNC: 86 MG/DL (ref 70–99)
HCT VFR BLD AUTO: 37.9 %
HGB BLD-MCNC: 13.2 G/DL
IMM GRANULOCYTES # BLD AUTO: 0.03 X10(3) UL (ref 0–1)
IMM GRANULOCYTES NFR BLD: 0.7 %
LYMPHOCYTES # BLD AUTO: 0.79 X10(3) UL (ref 1–4)
LYMPHOCYTES NFR BLD AUTO: 18.2 %
MAGNESIUM SERPL-MCNC: 1.9 MG/DL (ref 1.6–2.6)
MCH RBC QN AUTO: 33.8 PG (ref 26–34)
MCHC RBC AUTO-ENTMCNC: 34.8 G/DL (ref 31–37)
MCV RBC AUTO: 97.2 FL
MONOCYTES # BLD AUTO: 0.63 X10(3) UL (ref 0.1–1)
MONOCYTES NFR BLD AUTO: 14.5 %
NEUTROPHILS # BLD AUTO: 2.45 X10 (3) UL (ref 1.5–7.7)
NEUTROPHILS # BLD AUTO: 2.45 X10(3) UL (ref 1.5–7.7)
NEUTROPHILS NFR BLD AUTO: 56.6 %
OSMOLALITY SERPL CALC.SUM OF ELEC: 296 MOSM/KG (ref 275–295)
PLATELET # BLD AUTO: 155 10(3)UL (ref 150–450)
POTASSIUM SERPL-SCNC: 4.8 MMOL/L (ref 3.5–5.1)
RBC # BLD AUTO: 3.9 X10(6)UL
SODIUM SERPL-SCNC: 142 MMOL/L (ref 136–145)
WBC # BLD AUTO: 4.3 X10(3) UL (ref 4–11)

## 2024-11-09 PROCEDURE — 93306 TTE W/DOPPLER COMPLETE: CPT | Performed by: HOSPITALIST

## 2024-11-09 PROCEDURE — 99239 HOSP IP/OBS DSCHRG MGMT >30: CPT | Performed by: INTERNAL MEDICINE

## 2024-11-09 RX ORDER — METOPROLOL SUCCINATE 25 MG/1
25 TABLET, EXTENDED RELEASE ORAL
Status: DISCONTINUED | OUTPATIENT
Start: 2024-11-10 | End: 2024-11-09

## 2024-11-09 RX ORDER — DILTIAZEM HYDROCHLORIDE 180 MG/1
360 CAPSULE, EXTENDED RELEASE ORAL DAILY
Status: DISCONTINUED | OUTPATIENT
Start: 2024-11-09 | End: 2024-11-09

## 2024-11-09 RX ORDER — METOPROLOL SUCCINATE 50 MG/1
50 TABLET, EXTENDED RELEASE ORAL
Status: DISCONTINUED | OUTPATIENT
Start: 2024-11-10 | End: 2024-11-09

## 2024-11-09 RX ORDER — METOPROLOL SUCCINATE 25 MG/1
25 TABLET, EXTENDED RELEASE ORAL
Qty: 30 TABLET | Refills: 2 | Status: SHIPPED | OUTPATIENT
Start: 2024-11-10

## 2024-11-09 RX ORDER — FUROSEMIDE 20 MG/1
20 TABLET ORAL DAILY
Status: DISCONTINUED | OUTPATIENT
Start: 2024-11-09 | End: 2024-11-09

## 2024-11-09 RX ORDER — CETIRIZINE HYDROCHLORIDE 5 MG/1
5 TABLET ORAL DAILY
Status: DISCONTINUED | OUTPATIENT
Start: 2024-11-09 | End: 2024-11-09

## 2024-11-09 RX ORDER — METOPROLOL SUCCINATE 50 MG/1
50 TABLET, EXTENDED RELEASE ORAL
Qty: 30 TABLET | Refills: 2 | Status: SHIPPED | OUTPATIENT
Start: 2024-11-10 | End: 2024-11-09

## 2024-11-09 RX ORDER — COLCHICINE 0.6 MG/1
0.3 TABLET ORAL DAILY
Status: DISCONTINUED | OUTPATIENT
Start: 2024-11-09 | End: 2024-11-09

## 2024-11-09 RX ORDER — ALLOPURINOL 100 MG/1
50 TABLET ORAL DAILY
Status: DISCONTINUED | OUTPATIENT
Start: 2024-11-09 | End: 2024-11-09

## 2024-11-09 RX ORDER — FLUTICASONE PROPIONATE 50 MCG
2 SPRAY, SUSPENSION (ML) NASAL DAILY
Status: DISCONTINUED | OUTPATIENT
Start: 2024-11-09 | End: 2024-11-09

## 2024-11-09 RX ORDER — ATORVASTATIN CALCIUM 40 MG/1
40 TABLET, FILM COATED ORAL DAILY
Status: DISCONTINUED | OUTPATIENT
Start: 2024-11-09 | End: 2024-11-09

## 2024-11-09 RX ORDER — ACETAMINOPHEN 500 MG
1000 TABLET ORAL DAILY
Status: DISCONTINUED | OUTPATIENT
Start: 2024-11-09 | End: 2024-11-09

## 2024-11-09 NOTE — PROGRESS NOTES
Progress Note  Omid Knox Patient Status:  Observation    1940 MRN [id number]   Location [facility] 3W/SW Attending Nicolas Santos MD   Hosp Day # 0 PCP PHYSICIAN NONSTAFF     Subjective:  Pt states he is feeling much better, Denies lightheadednes or palpitations.     Objective:  /68 (BP Location: Right arm)   Pulse 75   Temp 97.7 °F (36.5 °C) (Oral)   Resp 18   Ht 5' 5\" (1.651 m)   Wt 223 lb 6.4 oz (101.3 kg)   SpO2 92%   BMI 37.18 kg/m²     Telemetry: SR    Intake/Output:    Intake/Output Summary (Last 24 hours) at 2024 1232  Last data filed at 2024 1019  Gross per 24 hour   Intake 750 ml   Output --   Net 750 ml       Last 3 Weights   24 0346 223 lb 6.4 oz (101.3 kg)   24 2111 227 lb 8 oz (103.2 kg)   24 2108 227 lb 8 oz (103.2 kg)   24 2100 238 lb 15.7 oz (108.4 kg)   24 0949 239 lb (108.4 kg)   24 1011 239 lb (108.4 kg)       Labs:  Recent Labs   Lab 24  1452 24  0703   * 86   BUN 21 20   CREATSERUM 1.20 1.00   EGFRCR 60 74   CA 10.0 10.0    142   K 4.2 4.8    106   CO2 23.0 31.0     Recent Labs   Lab 24  1452 24  0703   RBC 4.47 3.90   HGB 14.5 13.2   HCT 42.9 37.9*   MCV 96.0 97.2   MCH 32.4 33.8   MCHC 33.8 34.8   RDW 13.2 13.3   NEPRELIM 3.46 2.45   WBC 6.0 4.3   .0 155.0         Recent Labs   Lab 24  1452 24  1656   TROPHS 12 11       Diagnostics:  CT CHEST PE AORTA (IV ONLY) (CPT=71260)    Result Date: 2024  CONCLUSION: No CTA evidence of acute abnormality within the chest.  Nonacute findings are present and are described within the body of the report    Dictated by (CST): Larry Valle MD on 2024 at 6:48 PM     Finalized by (CST): Larry Valle MD on 2024 at 6:58 PM          XR CHEST AP PORTABLE  (CPT=71045)    Result Date: 2024  CONCLUSION:  1. Stable cardiomegaly.  No acute cardiopulmonary finding.    Dictated by (CST):  Brigido De Leon MD on 11/08/2024 at 5:07 PM     Finalized by (CST): Brigido De Leon MD on 11/08/2024 at 5:08 PM          Review of Systems   Cardiovascular:  Negative for chest pain, leg swelling and palpitations.   Respiratory:  Negative for cough and shortness of breath.        Physical Exam:    Gen: alert, oriented x 3, NAD  Heent: pupils equal, reactive. Mucous membranes moist.   Neck: no jvd  Cardiac: regular rate and rhythm, normal S1,S2, no murmur, clicks, rub or gallop  Lungs: diminished  Abd: soft, distended +bs  Ext: no edema  Skin: Warm, dry  Neuro: No focal deficits      Medications:     allopurinol  50 mg Oral Daily    apixaban  5 mg Oral BID    atorvastatin  40 mg Oral Daily    cetirizine  5 mg Oral Daily    fluticasone propionate  2 spray Each Nare Daily    furosemide  20 mg Oral Daily    dilTIAZem HCl ER Coated Beads  360 mg Oral Daily    acetaminophen  1,000 mg Oral Daily    colchicine  0.3 mg Oral Daily    metoprolol tartrate  25 mg Oral 2x Daily(Beta Blocker)           Assessment:  New Onset Paroxysmal Atrial Fibrillation  Converted back to NSR s/p IVP diltiazem  Started on lopressor; also on diltiazem  TSH WNL  Echo pending  [id number] 5 - on Eliquis 5mg po BID  Hx CAD   Trop WNL; ECG w/o acute ischemic changes  Denies chest pain   Hx PE s/p R SI joint surgery - on Eliquis  Hypertension - controlled  Hyperlipidemia - statin  Obesity - BMI 37  BRAEDEN - does not wear CPAP at home  Tobacco Use    Plan:  Maintaining NSR - continue diltiazem 360mg po daily  Transition lopressor to toprol 50mg po daily  Continue eliquis, statin  Echo is pending today, if WNL, will likely be able to discharge home today. Will await results.   Pt follows with [facility] - recommend f/u visit within 1 week with his primary cardiologist.     Plan of care discussed with patient, RN.    Celia Valenzuela, APRN  11/9/2024  12:32 PM  [phone] [facility]  [phone] [facility]        Patient seen and examined  independently.  Note reviewed and labs reviewed.  Agree with above assessment and plan.  Decrease metoprolol succinate to 25 mg daily.  Continue rhythm control strategy and Eliquis.  Home today.  F/u with [facility].

## 2024-11-09 NOTE — DISCHARGE SUMMARY
[facility]  part of [facility]    Discharge Summary    Omid Knox Patient Status:  Observation    1940 MRN [id number]   Location [facility] 3W/SW Attending Nicolas Santos MD   Hosp Day # 0 PCP PHYSICIAN NONSTAFF     Date of Admission: 2024 Disposition: Home or Self Care     Date of Discharge: 2024    Admitting Diagnosis: Hypomagnesemia [E83.42]  Atrial fibrillation and flutter (HCC) [I48.91, I48.92]    Hospital Discharge Diagnoses:   1.       Symptomatic atrial flutter with rapid ventricular response, currently in sinus rhythm.  2.       Morbid obesity.  3.       Alcohol abuse.  4.       Hypertension.  5.       History of pulmonary emboli    Lace+ Score: 72  59-90 High Risk  29-58 Medium Risk  0-28   Low Risk.    TCM Follow-Up Recommendation:  LACE > 58: High Risk of readmission after discharge from the hospital.      Problem List:   Patient Active Problem List   Diagnosis    Spinal stenosis of lumbar region    Mild intermittent asthma without complication (HCC)    History of kidney stones    Essential hypertension    Mixed hyperlipidemia    BRAEDEN (obstructive sleep apnea)    History of alcohol use    Kyphoscoliosis    Postprocedural hypotension    Acute kidney failure (HCC)    Allergic reaction caused by a drug    Gastrointestinal hemorrhage    Cough    Sacroiliitis (HCC)    Atherosclerosis of native coronary artery without angina pectoris    Shortness of breath    Primary hypertension    History of spinal fusion       Reason for Admission: rapid heart rate    Physical Exam:   General appearance: alert, appears stated age and cooperative  Pulmonary:  clear to auscultation bilaterally  Cardiovascular: S1, S2 normal, no murmur, click, rub or gallop, regular rate and rhythm  Abdominal: soft, non-tender; bowel sounds normal; no masses,  no organomegaly  Extremities: extremities normal, atraumatic, no cyanosis or edema  Psychiatric: calm      History of  Present Illness: The patient is an 84-year-old  male who came into the emergency department for evaluation of intermittent dizziness. Upon arrival to the emergency room noted to be in atrial flutter in and out intermixed with sinus rhythm, atrial flutter rate up to 150. CBC and chemistry unremarkable. Magnesium and TSH still pending. Chest x-ray still pending. The patient will be admitted to the hospital for further management. He received 1 dose of IV Cardizem in the emergency room. The patient said this had happened before and he was hospitalized overnight at [facility]. After that, he was seen by his primary care physician and he was started on Eliquis and Cardizem. Today, he felt dizzy with multiple episodes that come on suddenly and then it goes away. Symptoms kept on coming without chest pain and he decided to come into the emergency department at [facility] for evaluation. He sees a cardiologist at [facility].     Hospital Course:   1.       Symptomatic atrial flutter with rapid ventricular response, currently in sinus rhythm. ECHO was totally normal  2.       Morbid obesity.  3.       Alcohol abuse.  4.       Hypertension.  5.       History of pulmonary emboli.    Consultations: cards    Procedures: echo    Complications: none    Discharge Condition: Good    Discharge Medications:      Discharge Medications        START taking these medications        Instructions Prescription details   metoprolol succinate ER 25 MG Tb24  Commonly known as: Toprol XL  Start taking on: November 10, 2024      Take 1 tablet (25 mg total) by mouth Daily Beta Blocker.   Quantity: 30 tablet  Refills: 2            CONTINUE taking these medications        Instructions Prescription details   acetaminophen 500 MG Tabs  Commonly known as: Tylenol Extra Strength      Take 2 tablets (1,000 mg total) by mouth daily.   Refills: 0     allopurinol 100 MG Tabs  Commonly known as: Zyloprim      Take 0.5 tablets (50  mg total) by mouth daily.   Refills: 0     apixaban 5 MG Tabs  Commonly known as: Eliquis      Take 1 tablet (5 mg total) by mouth 2 (two) times daily.   Refills: 0     atorvastatin 40 MG Tabs  Commonly known as: Lipitor      Take 1 tablet (40 mg total) by mouth daily.   Refills: 0     cetirizine 10 MG Tabs  Commonly known as: ZyrTEC      Take 1 tablet (10 mg total) by mouth daily.   Refills: 0     clotrimazole-betamethasone 1-0.05 % Crea  Commonly known as: Lotrisone      Apply to AA on the feet BID for 14 days   Quantity: 45 g  Refills: 0     dilTIAZem HCl ER Coated Beads 360 MG Cp24  Commonly known as: CARDIZEM CD      Take 1 capsule (360 mg total) by mouth daily.   Refills: 0     fluticasone propionate 50 MCG/ACT Susp  Commonly known as: Flonase      2 sprays by Each Nare route daily.   Refills: 0     furosemide 20 MG Tabs  Commonly known as: Lasix      Take 1 tablet (20 mg total) by mouth daily.   Refills: 0     GLUCOSAMINE CHOND CMP DOUBLE OR      Take by mouth.   Refills: 0     lisinopril-hydroCHLOROthiazide 20-12.5 MG Tabs  Commonly known as: Zestoretic      Take 1 tablet by mouth daily.   Quantity: 30 tablet  Refills: 0     Multi-Vitamin/Minerals Tabs      Take 1 tablet by mouth daily.   Refills: 0            STOP taking these medications      colchicine 0.6 MG Tabs                  Where to Get Your Medications        These medications were sent to [id number] [place], [place] - [id number] [facility] [address], [phone], [phone]  [id number] [facility] [place] [address]      Phone: [phone]   metoprolol succinate ER 25 MG Tb24         Follow up Visits: Follow-up with in 1 week    Follow up Labs:      Other Discharge Instructions:     Nicolas Santos MD  11/9/2024  1:53 PM    > 35 min

## 2024-11-09 NOTE — ED QUICK NOTES
Orders for admission, patient is aware of plan and ready to go upstairs. Any questions, please call ED RN Angie at extension [phone].     Patient Covid vaccination status: Fully vaccinated     COVID Test Ordered in ED: None    COVID Suspicion at Admission: N/A    Running Infusions:  None    Mental Status/LOC at time of transport: A&Ox4    Other pertinent information:   CIWA score: N/A   NIH score:  N/A

## 2024-11-09 NOTE — H&P
[facility]    PATIENT'S NAME: TODD MENDOZA   ATTENDING PHYSICIAN: Medardo Lazaro MD   PATIENT ACCOUNT#:   [id number]    LOCATION:  [address]  MEDICAL RECORD #:   [id number]       YOB: 1940  ADMISSION DATE:       11/08/2024    HISTORY AND PHYSICAL EXAMINATION    CHIEF COMPLAINT:  Atrial flutter with rapid ventricular response, intermittent, associated with dizziness.    HISTORY OF PRESENT ILLNESS:  The patient is an 84-year-old  male who came into the emergency department for evaluation of intermittent dizziness.  Upon arrival to the emergency room noted to be in atrial flutter in and out intermixed with sinus rhythm, atrial flutter rate up to 150.  CBC and chemistry unremarkable.  Magnesium and TSH still pending.  Chest x-ray still pending.  The patient will be admitted to the hospital for further management.  He received 1 dose of IV Cardizem in the emergency room.  The patient said this had happened before and he was hospitalized overnight at [facility].  After that, he was seen by his primary care physician and he was started on Eliquis and Cardizem.  Today, he felt dizzy with multiple episodes that come on suddenly and then it goes away.  Symptoms kept on coming without chest pain and he decided to come into the emergency department at [place] for evaluation.  He sees a cardiologist at [facility].      PAST MEDICAL HISTORY:  The patient has history of bilateral pulmonary emboli in July and started on Eliquis.  He denies any history of atrial fibrillation.  He has a history of morbid obesity, obstructive sleep apnea, noncompliance with C-PAP machine, asthma, hypertension, hyperlipidemia, kidney stones, degenerative joint disease of lumbar spine, peptic ulcer disease, gout.    PAST SURGICAL HISTORY:  Bilateral sacroiliac joint fusions, anterior and posterior lumbar spinal fusion, total knee arthroplasty, cataract procedure, tonsillectomy.      MEDICATIONS:  Please see medication reconciliation list.  Currently anticoagulated with Eliquis.  Also takes Cardizem.    ALLERGIES:  Codeine, eggplant, oxycodone, peanuts, penicillin, and sulfa.    FAMILY HISTORY:  Positive for hypertension.    SOCIAL HISTORY:  Ex-tobacco user.  He drinks 3 to 4 alcoholic drinks on a daily basis.  He said he has never been in withdrawal before despite the fact that he stopped drinking 2 to 3 weeks in the recent past.  No drug use.  Lives with his family.  Sedentary lifestyle.  Usually independent in his basic activities of daily living.    PHYSICAL EXAMINATION:    GENERAL:  Alert, oriented to time, place, and person, no acute distress.   VITAL SIGNS:  Temperature 98.3.  Pulse 135 when he was in atrial flutter, now sinus rhythm, rate 80.  Respiratory rate 25.  Blood pressure 103/84.  Pulse ox 90% on room air.  HEENT:  Atraumatic.  Oropharynx clear.  Moist mucous membranes.  Normal hard and soft palate.  Eyes:  Anicteric sclerae.    NECK:  Supple.  No lymphadenopathy.  Trachea midline.  Full range of motion.  LUNGS:  Clear to auscultation bilaterally.  Normal respiratory effort.  HEART:  Regular rate and rhythm.  S1 and S2 auscultated.  No murmur.  ABDOMEN:  Soft, nondistended.  Obese.  Positive bowel sounds.  EXTREMITIES:  No peripheral edema, clubbing, or cyanosis.  NEUROLOGIC:  Motor and sensory intact.    ASSESSMENT:    1.   Symptomatic atrial flutter with rapid ventricular response, currently in sinus rhythm.  2.   Morbid obesity.  3.   Alcohol abuse.  4.   Hypertension.  5.   History of pulmonary emboli.    PLAN:  The patient will be admitted to telemetry floor.  Obtain Cardiology consult.  If he goes back into atrial flutter, we will start him on IV Cardizem drip.  Obtain 2D echocardiogram with Doppler.  Obtain CT scan of the chest rule out any recurrent PE though the patient reported he has been compliant with Eliquis.  Encouraged the patient to cut down on his alcohol  use.  Follow up on TSH and magnesium level.    Dictated By Laureen Ward MD  d: 11/08/2024 16:00:56  t: 11/08/2024 16:09:38  Job [id number]  FB/    cc: Medardo Lazaro MD

## 2024-11-09 NOTE — PLAN OF CARE
Problem: Patient Centered Care  Goal: Patient preferences are identified and integrated in the patient's plan of care  Description: Interventions:  - What would you like us to know as we care for you? From home with spouse  - Provide timely, complete, and accurate information to patient/family  - Incorporate patient and family knowledge, values, beliefs, and cultural backgrounds into the planning and delivery of care  - Encourage patient/family to participate in care and decision-making at the level they choose  - Honor patient and family perspectives and choices  Outcome: Progressing     Problem: Patient/Family Goals  Goal: Patient/Family Long Term Goal  Description: Patient's Long Term Goal: to go home    Interventions:  - take medications as prescribed  - follow MD orders  - monitor labs  - See additional Care Plan goals for specific interventions  Outcome: Progressing  Goal: Patient/Family Short Term Goal  Description: Patient's Short Term Goal: to control heart rate    Interventions:   - telemetry  - cardiology consult  - rate control medication  - 2Decho  - See additional Care Plan goals for specific interventions  Outcome: Progressing     Problem: CARDIOVASCULAR - ADULT  Goal: Maintains optimal cardiac output and hemodynamic stability  Description: INTERVENTIONS:  - Monitor vital signs, rhythm, and trends  - Monitor for bleeding, hypotension and signs of decreased cardiac output  - Evaluate effectiveness of vasoactive medications to optimize hemodynamic stability  - Monitor arterial and/or venous puncture sites for bleeding and/or hematoma  - Assess quality of pulses, skin color and temperature  - Assess for signs of decreased coronary artery perfusion - ex. Angina  - Evaluate fluid balance, assess for edema, trend weights  Outcome: Progressing  Goal: Absence of cardiac arrhythmias or at baseline  Description: INTERVENTIONS:  - Continuous cardiac monitoring, monitor vital signs, obtain 12 lead EKG if  indicated  - Evaluate effectiveness of antiarrhythmic and heart rate control medications as ordered  - Initiate emergency measures for life threatening arrhythmias  - Monitor electrolytes and administer replacement therapy as ordered  Outcome: Progressing     Problem: SAFETY ADULT - FALL  Goal: Free from fall injury  Description: INTERVENTIONS:  - Assess pt frequently for physical needs  - Identify cognitive and physical deficits and behaviors that affect risk of falls.  - [place] fall precautions as indicated by assessment.  - Educate pt/family on patient safety including physical limitations  - Instruct pt to call for assistance with activity based on assessment  - Modify environment to reduce risk of injury  - Provide assistive devices as appropriate  - Consider OT/PT consult to assist with strengthening/mobility  - Encourage toileting schedule  Outcome: Progressing     Patient alert and oriented x4.  No complaints of pain at this time.  Call light within reach. Plan for 2Decho.

## 2024-11-09 NOTE — RESPIRATORY THERAPY NOTE
CPAP/BIPAP EVALUATION: Done     CPAP/BIPAP INITIATION: Patient refused CPAP during this admission.     NOTES: RT will be available for further assistance and provide necessary equipment and CPAP machine as needed.

## 2024-11-09 NOTE — PLAN OF CARE
Patient is alert and oriented. Patient had an Echo today. Patient Orthostatics are negative. All safety measures are in place and call light is within reach.    Problem: Patient Centered Care  Goal: Patient preferences are identified and integrated in the patient's plan of care  Description: Interventions:  - What would you like us to know as we care for you? Patient is from home with wife  - Provide timely, complete, and accurate information to patient/family  - Incorporate patient and family knowledge, values, beliefs, and cultural backgrounds into the planning and delivery of care  - Encourage patient/family to participate in care and decision-making at the level they choose  - Honor patient and family perspectives and choices  Outcome: Progressing     Problem: Patient/Family Goals  Goal: Patient/Family Long Term Goal  Description: Patient's Long Term Goal: to go home    Interventions:  - take medications as prescribed  - follow MD orders  - monitor labs  - See additional Care Plan goals for specific interventions  Outcome: Progressing  Goal: Patient/Family Short Term Goal  Description: Patient's Short Term Goal: to control heart rate    Interventions:   - telemetry  - cardiology consult  - rate control medication  - 2Decho  - See additional Care Plan goals for specific interventions  Outcome: Progressing     Problem: CARDIOVASCULAR - ADULT  Goal: Maintains optimal cardiac output and hemodynamic stability  Description: INTERVENTIONS:  - Monitor vital signs, rhythm, and trends  - Monitor for bleeding, hypotension and signs of decreased cardiac output  - Evaluate effectiveness of vasoactive medications to optimize hemodynamic stability  - Monitor arterial and/or venous puncture sites for bleeding and/or hematoma  - Assess quality of pulses, skin color and temperature  - Assess for signs of decreased coronary artery perfusion - ex. Angina  - Evaluate fluid balance, assess for edema, trend weights  Outcome:  Progressing  Goal: Absence of cardiac arrhythmias or at baseline  Description: INTERVENTIONS:  - Continuous cardiac monitoring, monitor vital signs, obtain 12 lead EKG if indicated  - Evaluate effectiveness of antiarrhythmic and heart rate control medications as ordered  - Initiate emergency measures for life threatening arrhythmias  - Monitor electrolytes and administer replacement therapy as ordered  Outcome: Progressing     Problem: SAFETY ADULT - FALL  Goal: Free from fall injury  Description: INTERVENTIONS:  - Assess pt frequently for physical needs  - Identify cognitive and physical deficits and behaviors that affect risk of falls.  - [place] fall precautions as indicated by assessment.  - Educate pt/family on patient safety including physical limitations  - Instruct pt to call for assistance with activity based on assessment  - Modify environment to reduce risk of injury  - Provide assistive devices as appropriate  - Consider OT/PT consult to assist with strengthening/mobility  - Encourage toileting schedule  Outcome: Progressing

## 2024-11-09 NOTE — DISCHARGE PLANNING
Patient was provided with discharge instructions, education, and follow up information. Patient's wife present for discharge instructions with patient's consent. Prescriptions were already sent electronically to patient's pharmacy. Patient verbalizes understanding of follow up information, specifically medication changes, understanding of afib, following up with cardiology and PCP and signs and symptoms of when to call MD or EMS. Patient has no questions after reviewing all instructions and will be going home with his spouse.     Mercedes HAIDER, Discharge Leader [id number]

## 2024-11-11 ENCOUNTER — HOSPITAL ENCOUNTER (EMERGENCY)
Facility: HOSPITAL | Age: 84
Discharge: HOME | End: 2024-11-11
Attending: EMERGENCY MEDICINE
Payer: MEDICARE

## 2024-11-11 VITALS
DIASTOLIC BLOOD PRESSURE: 81 MMHG | OXYGEN SATURATION: 92 % | RESPIRATION RATE: 22 BRPM | HEART RATE: 70 BPM | TEMPERATURE: 97 F | SYSTOLIC BLOOD PRESSURE: 130 MMHG | HEIGHT: 65 IN | BODY MASS INDEX: 37.65 KG/M2 | WEIGHT: 226 LBS

## 2024-11-11 DIAGNOSIS — T50.905A ADVERSE EFFECT OF DRUG, INITIAL ENCOUNTER: ICD-10-CM

## 2024-11-11 DIAGNOSIS — R42 DIZZINESS: Primary | ICD-10-CM

## 2024-11-11 LAB
ALBUMIN SERPL-MCNC: 4 G/DL (ref 3.2–4.8)
ALBUMIN/GLOB SERPL: 1.7 {RATIO} (ref 1–2)
ALP LIVER SERPL-CCNC: 62 U/L
ALT SERPL-CCNC: 46 U/L
ANION GAP SERPL CALC-SCNC: 7 MMOL/L (ref 0–18)
AST SERPL-CCNC: 36 U/L (ref ?–34)
ATRIAL RATE: 89 BPM
BASOPHILS # BLD AUTO: 0.02 X10(3) UL (ref 0–0.2)
BASOPHILS NFR BLD AUTO: 0.4 %
BILIRUB SERPL-MCNC: 0.7 MG/DL (ref 0.2–1.1)
BUN BLD-MCNC: 31 MG/DL (ref 9–23)
BUN/CREAT SERPL: 22.5 (ref 10–20)
CALCIUM BLD-MCNC: 9.7 MG/DL (ref 8.7–10.4)
CHLORIDE SERPL-SCNC: 110 MMOL/L (ref 98–112)
CO2 SERPL-SCNC: 28 MMOL/L (ref 21–32)
CREAT BLD-MCNC: 1.38 MG/DL
DEPRECATED RDW RBC AUTO: 47 FL (ref 35.1–46.3)
EGFRCR SERPLBLD CKD-EPI 2021: 50 ML/MIN/1.73M2 (ref 60–?)
EOSINOPHIL # BLD AUTO: 0.29 X10(3) UL (ref 0–0.7)
EOSINOPHIL NFR BLD AUTO: 5.4 %
ERYTHROCYTE [DISTWIDTH] IN BLOOD BY AUTOMATED COUNT: 13.1 % (ref 11–15)
GLOBULIN PLAS-MCNC: 2.3 G/DL (ref 2–3.5)
GLUCOSE BLD-MCNC: 145 MG/DL (ref 70–99)
HCT VFR BLD AUTO: 41.9 %
HGB BLD-MCNC: 13.7 G/DL
IMM GRANULOCYTES # BLD AUTO: 0.01 X10(3) UL (ref 0–1)
IMM GRANULOCYTES NFR BLD: 0.2 %
LYMPHOCYTES # BLD AUTO: 0.88 X10(3) UL (ref 1–4)
LYMPHOCYTES NFR BLD AUTO: 16.4 %
MAGNESIUM SERPL-MCNC: 1.7 MG/DL (ref 1.6–2.6)
MCH RBC QN AUTO: 31.9 PG (ref 26–34)
MCHC RBC AUTO-ENTMCNC: 32.7 G/DL (ref 31–37)
MCV RBC AUTO: 97.7 FL
MONOCYTES # BLD AUTO: 0.52 X10(3) UL (ref 0.1–1)
MONOCYTES NFR BLD AUTO: 9.7 %
NEUTROPHILS # BLD AUTO: 3.66 X10 (3) UL (ref 1.5–7.7)
NEUTROPHILS # BLD AUTO: 3.66 X10(3) UL (ref 1.5–7.7)
NEUTROPHILS NFR BLD AUTO: 67.9 %
OSMOLALITY SERPL CALC.SUM OF ELEC: 309 MOSM/KG (ref 275–295)
P AXIS: 3 DEGREES
P-R INTERVAL: 230 MS
PLATELET # BLD AUTO: 188 10(3)UL (ref 150–450)
POTASSIUM SERPL-SCNC: 3.9 MMOL/L (ref 3.5–5.1)
PROT SERPL-MCNC: 6.3 G/DL (ref 5.7–8.2)
Q-T INTERVAL: 382 MS
QRS DURATION: 124 MS
QTC CALCULATION (BEZET): 464 MS
R AXIS: -48 DEGREES
RBC # BLD AUTO: 4.29 X10(6)UL
SODIUM SERPL-SCNC: 145 MMOL/L (ref 136–145)
T AXIS: 60 DEGREES
VENTRICULAR RATE: 89 BPM
WBC # BLD AUTO: 5.4 X10(3) UL (ref 4–11)

## 2024-11-11 PROCEDURE — 99285 EMERGENCY DEPT VISIT HI MDM: CPT

## 2024-11-11 PROCEDURE — 36415 COLL VENOUS BLD VENIPUNCTURE: CPT

## 2024-11-11 PROCEDURE — 99284 EMERGENCY DEPT VISIT MOD MDM: CPT

## 2024-11-11 PROCEDURE — 93010 ELECTROCARDIOGRAM REPORT: CPT

## 2024-11-11 PROCEDURE — 80053 COMPREHEN METABOLIC PANEL: CPT | Performed by: EMERGENCY MEDICINE

## 2024-11-11 PROCEDURE — 83735 ASSAY OF MAGNESIUM: CPT | Performed by: EMERGENCY MEDICINE

## 2024-11-11 PROCEDURE — 80053 COMPREHEN METABOLIC PANEL: CPT

## 2024-11-11 PROCEDURE — 93005 ELECTROCARDIOGRAM TRACING: CPT

## 2024-11-11 PROCEDURE — 85025 COMPLETE CBC W/AUTO DIFF WBC: CPT

## 2024-11-11 PROCEDURE — 85025 COMPLETE CBC W/AUTO DIFF WBC: CPT | Performed by: EMERGENCY MEDICINE

## 2024-11-11 RX ORDER — LIDOCAINE HCL/EPINEPHRINE/PF 2%-1:200K
20 VIAL (ML) INJECTION ONCE
Status: DISCONTINUED | OUTPATIENT
Start: 2024-11-11 | End: 2024-11-11

## 2024-11-11 NOTE — ED PROVIDER NOTES
Patient Seen in: [facility] Emergency Department      History   No chief complaint on file.    Stated Complaint: Hypertension    Subjective:   HPI      84-year-old male on Eliquis and high-dose Cardizem who was just admitted recently for rapid atrial fibrillation.  25 XL metoprolol was added.  Today he took the dose and felt dizziness and lightheadedness this morning.  States his blood pressure was a little low.  Feels fine now.  Here with his wife.  No other complaints.    Objective:     Past Medical History:    Anesthesia complication    anxiety from  OR experience as a child    Back pain    Back problem    CAD (coronary artery disease)    Calculus of kidney    Essential hypertension    High blood pressure    High cholesterol    History of blood transfusion    many years ago    Hyperkalemia    Sleep apnea    no therapy    Visual impairment    readers              Past Surgical History:   Procedure Laterality Date    Arthroscopy of joint unlisted Bilateral     Colonoscopy      Egd  2019        Tonsillectomy      Total knee replacement Bilateral     right, left                Social History     Socioeconomic History    Marital status:    Tobacco Use    Smoking status: Former     Current packs/day: 0.00     Average packs/day: 1 pack/day for 30.0 years (30.0 ttl pk-yrs)     Types: Cigarettes     Start date: 1969     Quit date: 1999     Years since quittin.5     Passive exposure: Never    Smokeless tobacco: Never   Vaping Use    Vaping status: Never Used   Substance and Sexual Activity    Alcohol use: Yes     Alcohol/week: 2.0 - 3.0 standard drinks of alcohol     Types: 2 - 3 Standard drinks or equivalent per week     Comment: DAILY    Drug use: Not Currently     Social Drivers of Health     Food Insecurity: No Food Insecurity (2024)    Food Insecurity     Food Insecurity: Never true   Transportation Needs: No Transportation Needs (2024)    Transportation Needs      Lack of Transportation: No    [facility]    Social Connections   Housing Stability: Low Risk  (11/8/2024)    Housing Stability     Housing Instability: No                  Physical Exam     ED Triage Vitals [11/11/24 1350]   /69   Pulse 91   Resp 18   Temp 97.3 °F (36.3 °C)   Temp src Oral   SpO2 91 %   O2 Device None (Room air)       Current Vitals:   Vital Signs  BP: 130/81  Pulse: 70  Resp: 22  Temp: 97.3 °F (36.3 °C)  Temp src: Oral  MAP (mmHg): 95    Oxygen Therapy  SpO2: 92 %  O2 Device: None (Room air)        Physical Exam  Constitutional: Oriented to person, place, and time.  Appears well-developed. No distress.   Head: Normocephalic and atraumatic.   Eyes: Conjunctivae are normal. Pupils are equal, round, and reactive to light.   Neck: Normal range of motion. Neck supple.   Cardiovascular: Normal rate, regular rhythm and intact and equal distal pulses.    Pulmonary/Chest: Effort normal. No respiratory distress.   Abdominal: Soft. There is no tenderness. There is no guarding.   Musculoskeletal: Normal range of motion. No edema or tenderness.   Neurological: Alert and oriented to person, place, and time.  No gross focal deficits  Skin: Skin is warm and dry.   Psychiatric: Normal mood and affect.  Behavior is normal.   Nursing note and vitals reviewed.    Differential diagnosis includes medication effect, dehydration, anemia.      ED Course     Labs Reviewed   COMP METABOLIC PANEL (14) - Abnormal; Notable for the following components:       Result Value    Glucose 145 (*)     BUN 31 (*)     Creatinine 1.38 (*)     BUN/CREA Ratio 22.5 (*)     Calculated Osmolality 309 (*)     eGFR-Cr 50 (*)     AST 36 (*)     All other components within normal limits   CBC WITH DIFFERENTIAL WITH PLATELET - Abnormal; Notable for the following components:    RDW-SD 47.0 (*)     Lymphocyte Absolute 0.88 (*)     All other components within normal limits    MAGNESIUM - Normal   RAINBOW DRAW LAVENDER   RAINBOW DRAW LIGHT GREEN   RAINBOW DRAW BLUE   RAINBOW DRAW GOLD     EKG    Rate, intervals and axes as noted on EKG Report.  Rate: 89  Rhythm: Sinus Rhythm  Reading: Mild artifact, no gross acute ischemic changes                       MDM              Medical Decision Making  Patient has been stable here.  Orthostatics negative.  Patient looks well.  Talk to Prasad for [facility].  Recommended holding his metoprolol for now and taking his as needed.  He will get the patient an appointment in the office.  Patient will continue other prescribed occasions.  Avoid NSAIDs.  Tylenol is fine for pain.  No indication for admission to the hospital at this time    Problems Addressed:  Adverse effect of drug, initial encounter: acute illness or injury with systemic symptoms  Dizziness: self-limited or minor problem    Amount and/or Complexity of Data Reviewed  External Data Reviewed: radiology.     Details: 11/9/24 Echo:  Conclusions:     1. Left ventricle: The cavity size was at the upper limits of normal. Wall      thickness was normal. Systolic function was mildly reduced. The estimated      ejection fraction was 50-55%, by visual assessment. No diagnostic      evidence for regional wall motion abnormalities. Left ventricular      diastolic function parameters were normal for the patient's age.   2. Left atrium: The atrium was increased in size.   3. Mitral valve: There was mild regurgitation.   4. Right ventricle: The cavity size was normal. Systolic function was      normal.   5. Pulmonary arteries: Systolic pressure was within the normal range,      estimated to be 29mm Hg.   *     Labs: ordered. Decision-making details documented in ED Course.    Risk  OTC drugs.  Prescription drug management.  Decision regarding hospitalization.        Disposition and Plan     Clinical Impression:  1. Dizziness    2. Adverse effect of drug, initial encounter          Disposition:  Discharge  11/11/2024  4:49 pm    Follow-up:  Sai Vogt  [address] [address]  [phone]    Schedule an appointment as soon as possible for a visit      We recommend that you schedule follow up care with a primary care provider within the next three months to obtain basic health screening including reassessment of your blood pressure.      Medications Prescribed:  Discharge Medication List as of 11/11/2024  4:51 PM              Supplementary Documentation:

## 2024-11-11 NOTE — DISCHARGE INSTRUCTIONS
Cardiology PA recommended possibly holding your metoprolol for now.  You can take a dose if your heart rate is elevated.  They will get you an appointment and contact you with follow-up.

## 2024-11-11 NOTE — ED INITIAL ASSESSMENT (HPI)
Hypotension at home. Recently diagnosed with Afib Saturday. C/o dizziness. Denies CP. Prescribed Metoprolol and previously prescribed Cardizem.

## 2024-11-11 NOTE — ED QUICK NOTES
Pt to ED with wife, reporting here due to low blood pressure and high blood pressure readings at home. Reports hospitalized over the weekend due new onset afib.

## 2024-11-26 ENCOUNTER — HOSPITAL ENCOUNTER (OUTPATIENT)
Facility: HOSPITAL | Age: 84
Setting detail: OBSERVATION
Discharge: HOME | End: 2024-11-27
Attending: EMERGENCY MEDICINE | Admitting: HOSPITALIST
Payer: MEDICARE

## 2024-11-26 ENCOUNTER — APPOINTMENT (OUTPATIENT)
Dept: CT IMAGING | Facility: HOSPITAL | Age: 84
End: 2024-11-26
Attending: EMERGENCY MEDICINE
Payer: MEDICARE

## 2024-11-26 ENCOUNTER — APPOINTMENT (OUTPATIENT)
Dept: GENERAL RADIOLOGY | Facility: HOSPITAL | Age: 84
End: 2024-11-26
Attending: EMERGENCY MEDICINE
Payer: MEDICARE

## 2024-11-26 DIAGNOSIS — I65.01 STENOSIS OF RIGHT VERTEBRAL ARTERY: ICD-10-CM

## 2024-11-26 DIAGNOSIS — R42 VERTIGO: Primary | ICD-10-CM

## 2024-11-26 PROBLEM — R79.89 AZOTEMIA: Status: ACTIVE | Noted: 2024-11-26

## 2024-11-26 PROBLEM — R73.9 HYPERGLYCEMIA: Status: ACTIVE | Noted: 2024-11-26

## 2024-11-26 PROBLEM — I48.91 ATRIAL FIBRILLATION (HCC): Status: ACTIVE | Noted: 2024-11-08

## 2024-11-26 PROBLEM — E78.5 DYSLIPIDEMIA: Status: ACTIVE | Noted: 2024-11-26

## 2024-11-26 PROBLEM — I10 BENIGN ESSENTIAL HTN: Status: ACTIVE | Noted: 2024-07-09

## 2024-11-26 LAB
ALBUMIN SERPL-MCNC: 3.9 G/DL (ref 3.2–4.8)
ALBUMIN/GLOB SERPL: 1.8 {RATIO} (ref 1–2)
ALP LIVER SERPL-CCNC: 57 U/L
ALT SERPL-CCNC: 25 U/L
ANION GAP SERPL CALC-SCNC: 6 MMOL/L (ref 0–18)
AST SERPL-CCNC: 22 U/L (ref ?–34)
ATRIAL RATE: 69 BPM
BASOPHILS # BLD AUTO: 0.02 X10(3) UL (ref 0–0.2)
BASOPHILS NFR BLD AUTO: 0.3 %
BILIRUB SERPL-MCNC: 0.8 MG/DL (ref 0.2–1.1)
BUN BLD-MCNC: 27 MG/DL (ref 9–23)
CALCIUM BLD-MCNC: 9.4 MG/DL (ref 8.7–10.4)
CHLORIDE SERPL-SCNC: 106 MMOL/L (ref 98–112)
CO2 SERPL-SCNC: 28 MMOL/L (ref 21–32)
CREAT BLD-MCNC: 1.01 MG/DL
EGFRCR SERPLBLD CKD-EPI 2021: 73 ML/MIN/1.73M2 (ref 60–?)
EOSINOPHIL # BLD AUTO: 0.15 X10(3) UL (ref 0–0.7)
EOSINOPHIL NFR BLD AUTO: 2 %
ERYTHROCYTE [DISTWIDTH] IN BLOOD BY AUTOMATED COUNT: 12.1 %
GLOBULIN PLAS-MCNC: 2.2 G/DL (ref 2–3.5)
GLUCOSE BLD-MCNC: 110 MG/DL (ref 70–99)
HCT VFR BLD AUTO: 37 %
HGB BLD-MCNC: 12.5 G/DL
IMM GRANULOCYTES # BLD AUTO: 0.04 X10(3) UL (ref 0–1)
IMM GRANULOCYTES NFR BLD: 0.5 %
LYMPHOCYTES # BLD AUTO: 0.77 X10(3) UL (ref 1–4)
LYMPHOCYTES NFR BLD AUTO: 10 %
MCH RBC QN AUTO: 31.8 PG (ref 26–34)
MCHC RBC AUTO-ENTMCNC: 33.8 G/DL (ref 31–37)
MCV RBC AUTO: 94.1 FL
MONOCYTES # BLD AUTO: 0.83 X10(3) UL (ref 0.1–1)
MONOCYTES NFR BLD AUTO: 10.8 %
NEUTROPHILS # BLD AUTO: 5.88 X10 (3) UL (ref 1.5–7.7)
NEUTROPHILS # BLD AUTO: 5.88 X10(3) UL (ref 1.5–7.7)
NEUTROPHILS NFR BLD AUTO: 76.4 %
OSMOLALITY SERPL CALC.SUM OF ELEC: 296 MOSM/KG (ref 275–295)
P AXIS: 6 DEGREES
P-R INTERVAL: 230 MS
PLATELET # BLD AUTO: 172 10(3)UL (ref 150–450)
POTASSIUM SERPL-SCNC: 4 MMOL/L (ref 3.5–5.1)
PROT SERPL-MCNC: 6.1 G/DL (ref 5.7–8.2)
Q-T INTERVAL: 424 MS
QRS DURATION: 124 MS
QTC CALCULATION (BEZET): 454 MS
R AXIS: -31 DEGREES
RBC # BLD AUTO: 3.93 X10(6)UL
SODIUM SERPL-SCNC: 140 MMOL/L (ref 136–145)
T AXIS: 19 DEGREES
TROPONIN I SERPL HS-MCNC: 5 NG/L
VENTRICULAR RATE: 69 BPM
WBC # BLD AUTO: 7.7 X10(3) UL (ref 4–11)

## 2024-11-26 PROCEDURE — 71045 X-RAY EXAM CHEST 1 VIEW: CPT | Performed by: EMERGENCY MEDICINE

## 2024-11-26 PROCEDURE — 99223 1ST HOSP IP/OBS HIGH 75: CPT | Performed by: INTERNAL MEDICINE

## 2024-11-26 PROCEDURE — 70498 CT ANGIOGRAPHY NECK: CPT | Performed by: EMERGENCY MEDICINE

## 2024-11-26 PROCEDURE — 70496 CT ANGIOGRAPHY HEAD: CPT | Performed by: EMERGENCY MEDICINE

## 2024-11-26 RX ORDER — ATORVASTATIN CALCIUM 40 MG/1
40 TABLET, FILM COATED ORAL DAILY
Status: DISCONTINUED | OUTPATIENT
Start: 2024-11-27 | End: 2024-11-27

## 2024-11-26 RX ORDER — ONDANSETRON 2 MG/ML
4 INJECTION INTRAMUSCULAR; INTRAVENOUS EVERY 6 HOURS PRN
Status: DISCONTINUED | OUTPATIENT
Start: 2024-11-26 | End: 2024-11-27

## 2024-11-26 RX ORDER — MECLIZINE HCL 12.5 MG 12.5 MG/1
25 TABLET ORAL 3 TIMES DAILY PRN
Status: DISCONTINUED | OUTPATIENT
Start: 2024-11-26 | End: 2024-11-27

## 2024-11-26 RX ORDER — ONDANSETRON 2 MG/ML
4 INJECTION INTRAMUSCULAR; INTRAVENOUS ONCE
Status: COMPLETED | OUTPATIENT
Start: 2024-11-26 | End: 2024-11-26

## 2024-11-26 RX ORDER — ALLOPURINOL 100 MG/1
50 TABLET ORAL DAILY
Status: DISCONTINUED | OUTPATIENT
Start: 2024-11-26 | End: 2024-11-27

## 2024-11-26 RX ORDER — METOCLOPRAMIDE HYDROCHLORIDE 5 MG/ML
5 INJECTION INTRAMUSCULAR; INTRAVENOUS EVERY 8 HOURS PRN
Status: DISCONTINUED | OUTPATIENT
Start: 2024-11-26 | End: 2024-11-27

## 2024-11-26 RX ORDER — SODIUM CHLORIDE 9 MG/ML
INJECTION, SOLUTION INTRAVENOUS CONTINUOUS
Status: ACTIVE | OUTPATIENT
Start: 2024-11-26 | End: 2024-11-26

## 2024-11-26 RX ORDER — DOXEPIN HYDROCHLORIDE 50 MG/1
1 CAPSULE ORAL DAILY
Status: DISCONTINUED | OUTPATIENT
Start: 2024-11-27 | End: 2024-11-27

## 2024-11-26 RX ORDER — CETIRIZINE HYDROCHLORIDE 10 MG/1
10 TABLET ORAL DAILY
Status: DISCONTINUED | OUTPATIENT
Start: 2024-11-27 | End: 2024-11-26

## 2024-11-26 RX ORDER — FLUTICASONE PROPIONATE 50 MCG
2 SPRAY, SUSPENSION (ML) NASAL DAILY
Status: DISCONTINUED | OUTPATIENT
Start: 2024-11-26 | End: 2024-11-27

## 2024-11-26 RX ORDER — DILTIAZEM HYDROCHLORIDE 180 MG/1
360 CAPSULE, EXTENDED RELEASE ORAL DAILY
Status: DISCONTINUED | OUTPATIENT
Start: 2024-11-27 | End: 2024-11-27

## 2024-11-26 RX ORDER — CETIRIZINE HYDROCHLORIDE 5 MG/1
5 TABLET ORAL DAILY
Status: DISCONTINUED | OUTPATIENT
Start: 2024-11-27 | End: 2024-11-27

## 2024-11-26 RX ORDER — ACETAMINOPHEN 500 MG
500 TABLET ORAL EVERY 4 HOURS PRN
Status: DISCONTINUED | OUTPATIENT
Start: 2024-11-26 | End: 2024-11-27

## 2024-11-26 RX ORDER — METOPROLOL SUCCINATE 25 MG/1
25 TABLET, EXTENDED RELEASE ORAL
Status: DISCONTINUED | OUTPATIENT
Start: 2024-11-27 | End: 2024-11-27

## 2024-11-26 RX ORDER — MECLIZINE HYDROCHLORIDE 25 MG/1
25 TABLET ORAL ONCE
Status: COMPLETED | OUTPATIENT
Start: 2024-11-26 | End: 2024-11-26

## 2024-11-26 NOTE — ED PROVIDER NOTES
Patient Seen in: [facility] Emergency Department      History     Chief Complaint   Patient presents with    Dizziness     Stated Complaint: PT was having OP neck CT for arthritis and began having a spinning dizzy sensat*    Subjective:   HPI      Patient is an 84-year-old gentleman with multiple medical problems, A-fib/flutter recently diagnosed, on Eliquis, history of smoking, high blood pressure, who comes in with dizziness.  Patient states he was getting an outpatient CT scan for neck pain/arthritis.  When he laid down he became quite dizzy.  [place] like the room was spinning.  No history of similar symptoms.  No focal numbness or weakness.  No headaches.  No new neck pain.  Says he feels better though still mildly dizzy.  No other specific complaints.  Patient is otherwise at his medical baseline.  Denies chest pain or shortness of breath.  Is satting 89% on arrival.  Denies chest pain or shortness of breath.  No other specific complaints.    Objective:     Past Medical History:    Anesthesia complication    anxiety from  OR experience as a child    Back pain    Back problem    CAD (coronary artery disease)    Calculus of kidney    Essential hypertension    High blood pressure    High cholesterol    History of blood transfusion    many years ago    Hyperkalemia    Sleep apnea    no therapy    Visual impairment    readers              Past Surgical History:   Procedure Laterality Date    Arthroscopy of joint unlisted Bilateral     Colonoscopy      Egd  2019        Tonsillectomy      Total knee replacement Bilateral     right, left                Social History     Socioeconomic History    Marital status:    Tobacco Use    Smoking status: Former     Current packs/day: 0.00     Average packs/day: 1 pack/day for 30.0 years (30.0 ttl pk-yrs)     Types: Cigarettes     Start date: 1969     Quit date: 1999     Years since quittin.5     Passive exposure: Never    Smokeless tobacco:  Never   Vaping Use    Vaping status: Never Used   Substance and Sexual Activity    Alcohol use: Yes     Alcohol/week: 2.0 - 3.0 standard drinks of alcohol     Types: 2 - 3 Standard drinks or equivalent per week     Comment: DAILY    Drug use: Not Currently     Social Drivers of Health     Food Insecurity: No Food Insecurity (11/8/2024)    Food Insecurity     Food Insecurity: Never true   Transportation Needs: No Transportation Needs (11/8/2024)    Transportation Needs     Lack of Transportation: No    [facility] from [facility], [facility]    Social Connections   Housing Stability: Low Risk  (11/8/2024)    Housing Stability     Housing Instability: No                  Physical Exam     ED Triage Vitals [11/26/24 1304]   /74   Pulse 62   Resp 20   Temp 98.4 °F (36.9 °C)   Temp src Oral   SpO2 (!) 89 %   O2 Device None (Room air)       Current Vitals:   Vital Signs  BP: (!) 164/84  Pulse: 58  Resp: 21  Temp: 98.4 °F (36.9 °C)  Temp src: Oral  MAP (mmHg): (!) 106    Oxygen Therapy  SpO2: 96 %  O2 Device: None (Room air)        Physical Exam  General: Patient is resting comfortably in no acute distress  HEENT: Normal cephalic atraumatic.  Nonicteric sclera.  Moist mucous membranes.  No meningismus.  No adenopathy  Lungs: No tachypnea.  Lungs clear to auscultation bilaterally without rales/rhonchi.  Equal breath sounds bilaterally  Cardiac: No tachycardia.  No murmurs.  Regular rate and rhythm.  Abdomen: Soft and nontender throughout.  No rebound or guarding  Extremities: No clubbing/cyanosis/edema.  Skin: No rashes, no pallor  Neuro: Awake oriented ×3.  Nonfocal.  Good strength throughout normal speech.  Good strength throughout.  Nonfocal.    ED Course     Labs Reviewed   COMP METABOLIC PANEL (14) - Abnormal; Notable for the following components:       Result Value    Glucose 110 (*)     BUN 27 (*)     Calculated Osmolality 296 (*)     All other components within normal  limits   CBC WITH DIFFERENTIAL WITH PLATELET - Abnormal; Notable for the following components:    HGB 12.5 (*)     HCT 37.0 (*)     Lymphocyte Absolute 0.77 (*)     All other components within normal limits   TROPONIN I HIGH SENSITIVITY - Normal   RAINBOW DRAW BLUE     EKG    Rate, intervals and axes as noted on EKG Report.  Rate: 69  Rhythm: Sinus Rhythm  Reading: Sinus rhythm with a first-degree AV block.  Nonspecific interventricular delay.  Specific ST-T wave changes.  Axis/intervals are noted.  Otherwise, agree with EKG report                CTA of the head and neck: No acute intracranial hemorrhage or hydrocephalus.  Small vessel disease.  Severe narrowing of the distal V4 segment of the right vertebral artery.  Rest of the CT reviewed     Chest x-ray: I personally reviewed the films and my independent interpertaion showed no acute pathology.  Official report reviewed    Blood work reviewed.  CBC reviewed.  Troponin normal.  MDM      Patient presents with vertigo.  Happened when he laid flat for CT scan.  Does have a recent diagnosed A-fib a flutter on Eliquis.  Has a spine stimulator so cannot get an MRI.  Will send for CT CTA.  Will treat with Antivert, Zofran.  Suspect peripheral vertigo though patient does have risk for stroke.  Will reassess after blood work, imaging.  Admission disposition: 11/26/2024  5:23 PM       CTA reviewed and discussed with patient.  Patient with vertigo.  Possibly BPV though concerned given the vertebral artery narrowing.  He is already on Eliquis.  He still somewhat dizzy with ambulation.  Will admit for further care and treatment.  Discussed with neurology.  Continue with the Eliquis.  [facility]    Medical Decision Making      Disposition and Plan     Clinical Impression:  1. Vertigo    2. Stenosis of right vertebral artery         Disposition:  Admit  11/26/2024  5:23 pm    Follow-up:  No follow-up provider specified.        Medications  Prescribed:  Current Discharge Medication List              Supplementary Documentation:         Hospital Problems       Present on Admission  Date Reviewed: 7/12/2024            ICD-10-CM Noted POA    * (Principal) Vertigo R42 11/26/2024 Unknown    Azotemia R79.89 11/26/2024 Yes    Hyperglycemia R73.9 11/26/2024 Yes

## 2024-11-26 NOTE — ED QUICK NOTES
Orders for admission, patient is aware of plan and ready to go upstairs. Any questions, please call ED RN aaron at extension [phone].     Patient Covid vaccination status: Fully vaccinated     COVID Test Ordered in ED: None    COVID Suspicion at Admission: N/A    Running Infusions:  None    Mental Status/LOC at time of transport: axo4    Other pertinent information: Patiend goes by \"Bob\". normally uses walker at home but is unable to do so due to dizziness. Personal walker will be brought up with pt. Continent uses urinal. NIH=0  CIWA score: N/A   NIH score:  0

## 2024-11-26 NOTE — ED INITIAL ASSESSMENT (HPI)
PT was having OP neck CT for arthritis and began having a spinning dizzy sensation when laying down. Since then, pt decribes a spinning sensation at rest. Recently dx with afib 3-4 weeks ago and takes thinners.

## 2024-11-26 NOTE — H&P
[facility]     Omid Knox Patient Status:  Emergency    1940 MRN [id number]   Location [facility] EMERGENCY DEPARTMENT Attending Brett Rojas MD   Hosp Day # 0 PCP PHYSICIAN NONSTAFF     Chief Complaint: dizziness    Subjective:    History of Present Illness:     Omid Knox is a 84 year old male with PMhx Afib on eliquis, DL, HTN, cervical radiculopathy presents with dizziness. Pt was getting CT for arthritis when he got very dizzy. HE aishwarya any focal weakness, numbness or tingling. He has hx of Afib and is on eliquis. He denies any focal weakness, numbness or tingling. No CP or SOB. He currently denies any dizziness at this time. He does have a spinal stimulator in place so is unable to get MRI.         History/Other:    Past Medical History:  Past Medical History:    Anesthesia complication    anxiety from  OR experience as a child    Back pain    Back problem    CAD (coronary artery disease)    Calculus of kidney    Essential hypertension    High blood pressure    High cholesterol    History of blood transfusion    many years ago    Hyperkalemia    Sleep apnea    no therapy    Visual impairment    readers     Past Surgical History:   Past Surgical History:   Procedure Laterality Date    Arthroscopy of joint unlisted Bilateral     Colonoscopy      Egd  2019        Tonsillectomy      Total knee replacement Bilateral     right, left      Family History:   Family History   Problem Relation Age of Onset    Cancer Mother         stomach cancer age 52     Social History:    reports that he quit smoking about 25 years ago. His smoking use included cigarettes. He started smoking about 55 years ago. He has a 30 pack-year smoking history. He has never been exposed to tobacco smoke. He has never used smokeless tobacco. He reports current alcohol use of about 2.0 - 3.0 standard drinks of alcohol per week. He reports that he does not currently  use drugs.     Allergies: Allergies[1]    Medications:  Medications Ordered Prior to Encounter[2]    Review of Systems:   A comprehensive review of systems was completed.    Pertinent positives and negatives noted in the HPI.    Objective:   Physical Exam:    /90 (BP Location: Left arm)   Pulse 57   Temp 97.5 °F (36.4 °C) (Oral)   Resp 20   Ht 165.1 cm (5' 5\")   Wt 226 lb (102.5 kg)   SpO2 93%   BMI 37.61 kg/m²   General: No acute distress, Alert  Respiratory: No rhonchi, no wheezes  Cardiovascular: S1, S2. Regular rate and rhythm  Abdomen: Soft, Non-tender, non-distended, positive bowel sounds  Neuro: No new focal deficits  Extremities: No edema      Results:    Labs:      Labs Last 24 Hours:    Recent Labs   Lab 11/26/24  1314   RBC 3.93   HGB 12.5*   HCT 37.0*   MCV 94.1   MCH 31.8   MCHC 33.8   RDW 12.1   NEPRELIM 5.88   WBC 7.7   .0       Recent Labs   Lab 11/26/24  1314   *   BUN 27*   CREATSERUM 1.01   EGFRCR 73   CA 9.4   ALB 3.9      K 4.0      CO2 28.0   ALKPHO 57   AST 22   ALT 25   BILT 0.8   TP 6.1       Lab Results   Component Value Date    INR 1.05 06/18/2024    INR 1.07 03/01/2024    INR 1.08 05/14/2019       Recent Labs   Lab 11/26/24  1314   TROPHS 5       No results for input(s): \"TROP\", \"PBNP\" in the last 168 hours.    No results for input(s): \"PCT\" in the last 168 hours.    Imaging: Imaging data reviewed in Epic.    Assessment & Plan:      #Dizziness  #R vertebral artery severe narrowing  Presumed from posterior circulation restriction  Neurology to evaluate  PT/OT  CTA reviewed  Not able to get MRI    #Afib  Rate controlled on eliquis    #Dyslipidemia  Statin    #Ess HTN  Continue home meds    #Cervical Radiculopathy        Plan of care discussed with patient, ED physician     Raul Plata MD    Supplementary Documentation:     The 21st Century Cures Act makes medical notes like these available to patients in the interest of transparency. Please be  advised this is a medical document. Medical documents are intended to carry relevant information, facts as evident, and the clinical opinion of the practitioner. The medical note is intended as peer to peer communication and may appear blunt or direct. It is written in medical language and may contain abbreviations or verbiage that are unfamiliar.                                       [1]   Allergies  Allergen Reactions    Codeine HIVES    Eggplant HIVES    Hydrocodone HYPOTENSION    Oxycodone HIVES    Peanuts Tightness in Throat     Patient not sure if he has this allergy    Penicillins ANAPHYLAXIS     Per patient no organ involvement or skin blistering    Sulfa Antibiotics UNKNOWN     As a kid   [2]   Current Facility-Administered Medications on File Prior to Encounter   Medication Dose Route Frequency Provider Last Rate Last Admin    [COMPLETED] Perflutren Lipid Microsphere (DEFINITY) 6.52 MG/ML injection 1.5 mL  1.5 mL Intravenous ONCE PRN Nicolas Santos MD   1.5 mL at 11/09/24 0910    [COMPLETED] dilTIAZem (cardIZEM) 25 mg/5mL injection 10 mg  10 mg Intravenous Once Medardo Lazaro MD   10 mg at 11/08/24 1538    [COMPLETED] magnesium sulfate in sterile water for injection 2 g/50mL IVPB premix 2 g  2 g Intravenous Once Medardo Lazaro MD   Stopped at 11/08/24 2100    [COMPLETED] iopamidol 76% (ISOVUE-370) injection for power injector  80 mL Intravenous ONCE PRN Medardo Lazaro MD   80 mL at 11/08/24 1836     Current Outpatient Medications on File Prior to Encounter   Medication Sig Dispense Refill    GLUCOSAMINE-CHONDROITIN OR Take 1 tablet by mouth in the morning, at noon, and at bedtime. Pt takes 1,500mg spaced out daily      allopurinol 100 MG Oral Tab Take 0.5 tablets (50 mg total) by mouth daily.      apixaban 5 MG Oral Tab Take 1 tablet (5 mg total) by mouth 2 (two) times daily.      dilTIAZem HCl ER Coated Beads 360 MG Oral Capsule SR 24 Hr Take 1 capsule (360 mg total) by mouth daily.       Multiple Vitamins-Minerals (MULTI-VITAMIN/MINERALS) Oral Tab Take 1 tablet by mouth daily.      fluticasone propionate 50 MCG/ACT Nasal Suspension 2 sprays by Each Nare route daily.      atorvastatin 40 MG Oral Tab Take 1 tablet (40 mg total) by mouth daily.      cetirizine 10 MG Oral Tab Take 1 tablet (10 mg total) by mouth daily.      acetaminophen 500 MG Oral Tab Take 1 tablet (500 mg total) by mouth in the morning, at noon, and at bedtime.      metoprolol succinate ER 25 MG Oral Tablet 24 Hr Take 1 tablet (25 mg total) by mouth Daily Beta Blocker. 30 tablet 2

## 2024-11-27 VITALS
WEIGHT: 226 LBS | RESPIRATION RATE: 26 BRPM | OXYGEN SATURATION: 91 % | TEMPERATURE: 98 F | SYSTOLIC BLOOD PRESSURE: 133 MMHG | DIASTOLIC BLOOD PRESSURE: 83 MMHG | HEIGHT: 65 IN | HEART RATE: 69 BPM | BODY MASS INDEX: 37.65 KG/M2

## 2024-11-27 PROBLEM — G62.9 POLYNEUROPATHY: Status: ACTIVE | Noted: 2024-11-27

## 2024-11-27 LAB
ANION GAP SERPL CALC-SCNC: 6 MMOL/L (ref 0–18)
BASOPHILS # BLD AUTO: 0.01 X10(3) UL (ref 0–0.2)
BASOPHILS NFR BLD AUTO: 0.2 %
BUN BLD-MCNC: 19 MG/DL (ref 9–23)
CALCIUM BLD-MCNC: 9 MG/DL (ref 8.7–10.4)
CHLORIDE SERPL-SCNC: 105 MMOL/L (ref 98–112)
CHOLEST SERPL-MCNC: 114 MG/DL (ref ?–200)
CO2 SERPL-SCNC: 27 MMOL/L (ref 21–32)
CREAT BLD-MCNC: 0.9 MG/DL
EGFRCR SERPLBLD CKD-EPI 2021: 84 ML/MIN/1.73M2 (ref 60–?)
EOSINOPHIL # BLD AUTO: 0.19 X10(3) UL (ref 0–0.7)
EOSINOPHIL NFR BLD AUTO: 3.2 %
ERYTHROCYTE [DISTWIDTH] IN BLOOD BY AUTOMATED COUNT: 12.2 %
EST. AVERAGE GLUCOSE BLD GHB EST-MCNC: 100 MG/DL (ref 68–126)
GLUCOSE BLD-MCNC: 97 MG/DL (ref 70–99)
HBA1C MFR BLD: 5.1 % (ref ?–5.7)
HCT VFR BLD AUTO: 36.3 %
HDLC SERPL-MCNC: 58 MG/DL (ref 40–59)
HGB BLD-MCNC: 12.4 G/DL
IMM GRANULOCYTES # BLD AUTO: 0.02 X10(3) UL (ref 0–1)
IMM GRANULOCYTES NFR BLD: 0.3 %
LDLC SERPL CALC-MCNC: 44 MG/DL (ref ?–100)
LYMPHOCYTES # BLD AUTO: 0.63 X10(3) UL (ref 1–4)
LYMPHOCYTES NFR BLD AUTO: 10.7 %
MCH RBC QN AUTO: 32.6 PG (ref 26–34)
MCHC RBC AUTO-ENTMCNC: 34.2 G/DL (ref 31–37)
MCV RBC AUTO: 95.5 FL
MONOCYTES # BLD AUTO: 0.72 X10(3) UL (ref 0.1–1)
MONOCYTES NFR BLD AUTO: 12.3 %
NEUTROPHILS # BLD AUTO: 4.3 X10 (3) UL (ref 1.5–7.7)
NEUTROPHILS # BLD AUTO: 4.3 X10(3) UL (ref 1.5–7.7)
NEUTROPHILS NFR BLD AUTO: 73.3 %
NONHDLC SERPL-MCNC: 56 MG/DL (ref ?–130)
OSMOLALITY SERPL CALC.SUM OF ELEC: 288 MOSM/KG (ref 275–295)
PLATELET # BLD AUTO: 166 10(3)UL (ref 150–450)
POTASSIUM SERPL-SCNC: 4.2 MMOL/L (ref 3.5–5.1)
RBC # BLD AUTO: 3.8 X10(6)UL
SODIUM SERPL-SCNC: 138 MMOL/L (ref 136–145)
TRIGL SERPL-MCNC: 52 MG/DL (ref 30–149)
VLDLC SERPL CALC-MCNC: 7 MG/DL (ref 0–30)
WBC # BLD AUTO: 5.9 X10(3) UL (ref 4–11)

## 2024-11-27 PROCEDURE — 99239 HOSP IP/OBS DSCHRG MGMT >30: CPT | Performed by: INTERNAL MEDICINE

## 2024-11-27 PROCEDURE — 99223 1ST HOSP IP/OBS HIGH 75: CPT | Performed by: OTHER

## 2024-11-27 PROCEDURE — 99223 1ST HOSP IP/OBS HIGH 75: CPT | Performed by: NEUROLOGICAL SURGERY

## 2024-11-27 RX ORDER — ACETAMINOPHEN 500 MG
1000 TABLET ORAL EVERY 8 HOURS PRN
Status: DISCONTINUED | OUTPATIENT
Start: 2024-11-27 | End: 2024-11-27

## 2024-11-27 RX ORDER — GABAPENTIN 100 MG/1
100 CAPSULE ORAL 3 TIMES DAILY
Status: DISCONTINUED | OUTPATIENT
Start: 2024-11-27 | End: 2024-11-27

## 2024-11-27 RX ORDER — GABAPENTIN 100 MG/1
100 CAPSULE ORAL 3 TIMES DAILY
Qty: 30 CAPSULE | Refills: 2 | Status: SHIPPED | OUTPATIENT
Start: 2024-11-27

## 2024-11-27 RX ORDER — MECLIZINE HYDROCHLORIDE 25 MG/1
25 TABLET ORAL 3 TIMES DAILY PRN
Qty: 15 TABLET | Refills: 0 | Status: SHIPPED | OUTPATIENT
Start: 2024-11-27

## 2024-11-27 NOTE — PHYSICAL THERAPY NOTE
PHYSICAL THERAPY EVALUATION - INPATIENT     Room Number: 7603/7603-A  Evaluation Date: 11/27/2024  Type of Evaluation: Initial  Physician Order: PT Eval and Treat    Presenting Problem: Dizziness, R VA narrowing  Co-Morbidities : afib, dyslipidemia, HTN, cervical radiculopathy, SIJ fusion 07/204  Reason for Therapy: Mobility Dysfunction and Discharge Planning    PHYSICAL THERAPY ASSESSMENT   Patient is a 84 year old male admitted 11/26/2024 for dizziness, R VA narrowing.  Prior to admission, patient's baseline is mod I.  Patient is currently functioning near baseline with bed mobility, transfers, and gait.  Patient is requiring contact guard assist as a result of the following impairments: decreased functional strength, decreased endurance/aerobic capacity, pain, impaired static and dynamic balance, impaired coordination, impaired motor planning, decreased muscular endurance, medical status, and increased O2 needs from baseline.  Physical Therapy will continue to follow for duration of hospitalization.    Patient will benefit from continued skilled PT Services at discharge to promote prior level of function and safety with additional support and return home with home health PT.    PLAN DURING HOSPITALIZATION  Nursing Mobility Recommendation : 1 Assist     PT Treatment Plan: Bed mobility;Body mechanics;Endurance;Energy conservation;Patient education;Strengthening;Gait training;Transfer training;Balance training  Rehab Potential : Fair  Frequency (Obs): 3x/week     CURRENT GOALS    Goal #1 Patient is able to demonstrate supine - sit EOB @ level: supervision     Goal #2 Patient is able to demonstrate transfers EOB to/from Chair/Wheelchair at assistance level: supervision     Goal #3 Patient is able to ambulate 150 feet with assist device: walker - rolling at assistance level: supervision     Goal #4 Pt is able to ascend and descend 2 stairs with railing and supervision   Goal #5    Goal #6    Goal Comments: Goals  established on 2024      PHYSICAL THERAPY MEDICAL/SOCIAL HISTORY  History related to current admission: Patient is a 84 year old male admitted on 2024 from home for dizziness.  Pt diagnosed with R VA narrowing.    HOME SITUATION  Type of Home: House  Home Layout: Able to live on main level  Stairs to Enter : 2   Railing: Yes              Lives With: Spouse               Prior Level of [place]: Pt reports he typically ambulates with RW. Reports history of chronic back pain.     SUBJECTIVE  \"I need to see a rheumatologist while I'm here for my feet\"     OBJECTIVE  Precautions: Bed/chair alarm  Fall Risk: High fall risk    WEIGHT BEARING RESTRICTION     PAIN ASSESSMENT  Ratin  Location: feet  Management Techniques: Activity promotion    COGNITION  Safety Judgement:  decreased awareness of need for assistance and decreased awareness of need for safety    RANGE OF MOTION AND STRENGTH ASSESSMENT  See OT note for UE assessment    Lower extremity ROM is within functional limits      Lower extremity strength is within functional limits      BALANCE  Static Sitting: Fair +  Dynamic Sitting: Fair  Static Standing: Poor +  Dynamic Standing: Poor +    ADDITIONAL TESTS  Additional Tests: Modified [place]              Modified [place]: 4                  ACTIVITY TOLERANCE                         O2 WALK  Oxygen Therapy  SPO2% on Oxygen at Rest: 94    NEUROLOGICAL FINDINGS                        AM-PAC '6-Clicks' INPATIENT SHORT FORM - BASIC MOBILITY  How much difficulty does the patient currently have...  Patient Difficulty: Turning over in bed (including adjusting bedclothes, sheets and blankets)?: A Little   Patient Difficulty: Sitting down on and standing up from a chair with arms (e.g., wheelchair, bedside commode, etc.): A Little   Patient Difficulty: Moving from lying on back to sitting on the side of the bed?: A Little   How much help from another person does the patient currently need...   Help from  Another: Moving to and from a bed to a chair (including a wheelchair)?: A Little   Help from Another: Need to walk in hospital room?: A Little   Help from Another: Climbing 3-5 steps with a railing?: A Little     AM-PAC Score:  Raw Score: 18   Approx Degree of Impairment: 46.58%   Standardized Score (AM-PAC Scale): 43.63   CMS Modifier (G-Code): CK    FUNCTIONAL ABILITY STATUS  Gait Assessment   Functional Mobility/Gait Assessment  Gait Assistance: Supervision;Contact guard assist  Distance (ft): 10, 10, 40  Assistive Device: Rolling walker  Pattern: Shuffle    Skilled Therapy Provided     Bed Mobility:  Rolling: NT  Supine to sit: Michelle   Sit to supine: NT     Transfer Mobility:  Sit to stand: CGA   Stand to sit: CGA  Gait = CGA progressing to supervision    Therapist's Comments: RN cleared for session. Pt agreeable for therapy, received supine. Pt cued on bed mobility for supine<>sit, pt preferring to elevate HOB for assist despite education regarding practice from flat bed. Pt with RW 6 notches too tall - reports he likes it this tall to assist with upright posture for his back pain. Pt demo poor balance and control of this height RW. RW height lowered down, pt demo improved balance and gait mechanics. Educated on proper RW height. Instructed to call for nursing staff for any needs and OOB mobility.     Exercise/Education Provided:  Bed mobility  Body mechanics  Energy conservation  Functional activity tolerated  Gait training  Posture  Strengthening  Transfer training    Patient End of Session: Up in chair;Needs met;Call light within reach;RN aware of session/findings;All patient questions and concerns addressed;Hospital anti-slip socks;Alarm set;Discussed recommendations with /      Patient Evaluation Complexity Level:  History High - 3 or more personal factors and/or co-morbidities   Examination of body systems Low -  addressing 1-2 elements   Clinical Presentation  Moderate - Evolving    Clinical Decision Making Low Complexity       PT Session Time: 20 minutes  Gait Training: 15 minutes  Therapeutic Activity: 5 minutes

## 2024-11-27 NOTE — OCCUPATIONAL THERAPY NOTE
OCCUPATIONAL THERAPY EVALUATION - INPATIENT     Room Number: 7603/7603-A  Evaluation Date: 11/27/2024  Type of Evaluation: Initial  Presenting Problem: dizzy    Physician Order: IP Consult to Occupational Therapy  Reason for Therapy: ADL/IADL Dysfunction and Discharge Planning    OCCUPATIONAL THERAPY ASSESSMENT   Patient is currently functioning near baseline with toileting, upper body dressing, lower body dressing, grooming, bed mobility, transfers, static sitting balance, dynamic sitting balance, static standing balance, dynamic standing balance, maintaining seated position, and functional standing tolerance. Prior to admission, patient's baseline is Mod I at home.  Patient is requiring minimum assistance as a result of the following impairments: decreased functional strength, decreased endurance, and decreased muscular endurance. Occupational Therapy will continue to follow for duration of hospitalization.    Patient will benefit from continued skilled OT Services at discharge to promote prior level of function and safety with additional support and return home with home health OT    History Related to Current Admission: Patient is a 84 year old male admitted on 11/26/2024 with Presenting Problem: dizzy. Co-Morbidities : afib, dyslipidemia, HTN, cervical radiculopathy, SIJ fusion 07/204    WEIGHT BEARING RESTRICTION       Recommendations for nursing staff:   Transfers: CGA  Toileting location: toilet    EVALUATION SESSION:  Patient Start of Session: supine in bed for session    FUNCTIONAL TRANSFER ASSESSMENT  Sit to Stand: Edge of Bed  Edge of Bed: Contact Guard Assist  Toilet Transfer: Contact Guard Assist (with BUE arm support)    BED MOBILITY  Rolling: Minimal Assist  Supine to Sit : Contact Guard Assist  Scooting: CGA to EOB    BALANCE ASSESSMENT  Static Sitting: Contact Guard Assist  Static Standing: Contact Guard Assist    FUNCTIONAL ADL ASSESSMENT  Grooming Standing: Contact Guard Assist (to wash up at  sink)  LB Dressing Seated: Minimal Assist (for socks)  LB Dressing Standing: Contact Guard Assist (to raise and lower brief for toileting)  Toileting Seated: Contact Guard Assist (to use std toilet)    ACTIVITY TOLERANCE: vitals stable                         O2 SATURATIONS       COGNITION  Overall Cognitive Status:  WFL - within functional limits    Upper Extremity   ROM: within functional limits   Strength: within functional limits   Coordination  Gross motor: WNL  Fine motor: WNL  Sensation: Light touch:  intact    EDUCATION PROVIDED  Patient Education : Role of Occupational Therapy; Plan of Care  Patient's Response to Education: Verbalized Understanding; Returned Demonstration    Equipment used: RW  Demonstrates functional use, Would benefit from additional trial      Therapist comments: Pt reported fatigue at home, pt educated on work simplification and energy conservation for at home to assist with independence with fatigue at home.     Patient End of Session: Up in chair;Needs met;Call light within reach;All patient questions and concerns addressed;Hospital anti-slip socks;Alarm set    OCCUPATIONAL PROFILE    HOME SITUATION  Type of Home: House  Home Layout: Able to live on main level  Lives With: Spouse    Toilet and Equipment: Comfort height toilet  Shower/Tub and Equipment: Walk-in shower  Other Equipment: None    Occupation/Status: retired  Hand Dominance: Right          Prior Level of Function: Prior to admission, patient's baseline is Mod I at home.    SUBJECTIVE   Pt stated, \"I am not dizzy anymore.\"    PAIN ASSESSMENT  Ratin  Location: BLE  Management Techniques: Activity promotion;Body mechanics;Breathing techniques;Relaxation;Repositioning    OBJECTIVE  Precautions: Bed/chair alarm  Fall Risk: High fall risk    ASSESSMENTS    AM-PAC ‘6-Clicks’ Inpatient Daily Activity Short Form  -   Putting on and taking off regular lower body clothing?: A Little  -   Bathing (including washing, rinsing,  drying)?: A Little  -   Toileting, which includes using toilet, bedpan or urinal? : A Little  -   Putting on and taking off regular upper body clothing?: A Little  -   Taking care of personal grooming such as brushing teeth?: A Little  -   Eating meals?: A Little    AM-PAC Score:  Score: 18  Approx Degree of Impairment: 46.65%  Standardized Score (AM-PAC Scale): 38.66    ADDITIONAL TESTS     NEUROLOGICAL FINDINGS      COGNITION ASSESSMENTS     PLAN     OT Treatment Plan: Balance activities;Energy conservation/work simplification techniques;ADL training;IADL training;Continued evaluation;Compensatory technique education;Equipment eval/education;Patient/Family training;Patient/Family education;Endurance training;UE strengthening/ROM;Functional transfer training  Rehab Potential : Good  Frequency: 3-5x/week  Number of Visits to Meet Established Goals: 5    ADL Goals   Patient will perform upper body dressing:  with supervision  Patient will perform lower body dressing:  with supervision  Patient will perform toileting: with supervision    Functional Transfer Goals  Patient will transfer from supine to sit:  with supervision  Patient will transfer from sit to stand:  with supervision  Patient will transfer to toilet:  with supervision    UE Exercise Program Goal  Patient will be supervision with bilateral AROM HEP (home exercise program).    Additional Goals:  Pt will verbalize at least 3 energy conservation techniques  Pt will stand at sink for 5 minutes to complete grooming routine    Patient Evaluation Complexity Level:   Occupational Profile/Medical History MODERATE - Expanded review of history including review of medical or therapy record   Specific performance deficits impacting engagement in ADL/IADL MODERATE  3 - 5 performance deficits   Client Assessment/Performance Deficits MODERATE - Comorbidities and min to mod modifications of tasks    Clinical Decision Making MODERATE - Analysis of occupational profile,  detailed assessments, several treatment options    Overall Complexity MODERATE     OT Session Time: 20 minutes  Self-Care Home Management: 10 minutes  Therapeutic Activity: 5 minutes  Neuromuscular Re-education: 0 minutes  Therapeutic Exercise: 0 minutes  Cognitive Skills: 0 minutes  Sensory Integrative: 0 minutes  Orthotic Management and Trainin minutes  Can add/delete any of these

## 2024-11-27 NOTE — HOME CARE LIAISON
[facility]. Spoke w/ patient who is agreeable with [facility]. Contact information placed on AVS.

## 2024-11-27 NOTE — CONSULTS
[facility]   NEUROLOGY   CONSULT NOTE    Admission date: 2024  Reason for Consult: Dizziness.  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  Patient while getting CT scan patient his head and all of a sudden became dizzy.  There was no significant diplopia, dysarthria, dysphagia or weakness or, no numbness.  Symptoms resolved.  Patient has not had any other issues.  Currently feels back to baseline.  Patient complaining of bilateral feet paresthesias.    History obtained from patient, chart review.    Past Medical History:    Anesthesia complication    anxiety from  OR experience as a child    Back pain    Back problem    CAD (coronary artery disease)    Calculus of kidney    Essential hypertension    High blood pressure    High cholesterol    History of blood transfusion    many years ago    Hyperkalemia    Sleep apnea    no therapy    Visual impairment    readers     Past Surgical History:   Procedure Laterality Date    Arthroscopy of joint unlisted Bilateral     Colonoscopy      Egd  2019        Tonsillectomy      Total knee replacement Bilateral     right, left     Social History     Socioeconomic History    Marital status:    Tobacco Use    Smoking status: Former     Current packs/day: 0.00     Average packs/day: 1 pack/day for 30.0 years (30.0 ttl pk-yrs)     Types: Cigarettes     Start date: 1969     Quit date: 1999     Years since quittin.5     Passive exposure: Never    Smokeless tobacco: Never   Vaping Use    Vaping status: Never Used    Substance and Sexual Activity    Alcohol use: Yes     Alcohol/week: 2.0 - 3.0 standard drinks of alcohol     Types: 2 - 3 Standard drinks or equivalent per week     Comment: DAILY    Drug use: Not Currently     Social Drivers of Health     Food Insecurity: No Food Insecurity (11/26/2024)    Food Insecurity     Food Insecurity: Never true   Transportation Needs: No Transportation Needs (11/26/2024)    Transportation Needs     Lack of Transportation: No    [facility]    Social Connections   Housing Stability: Low Risk  (11/26/2024)    Housing Stability     Housing Instability: No     Family History   Problem Relation Age of Onset    Cancer Mother         stomach cancer age 52     Allergies Allergies[1]    Home Meds  Current Outpatient Medications   Medication Instructions    acetaminophen 500 MG Oral Tab 1 tablet, Oral, 3 times daily    allopurinol (ZYLOPRIM) 50 mg, Daily    apixaban (ELIQUIS) 5 mg, 2 times daily    atorvastatin (LIPITOR) 40 mg, Daily    cetirizine (ZYRTEC) 10 mg, Daily    dilTIAZem HCl ER Coated Beads (CARDIZEM CD) 360 mg, Daily    fluticasone propionate 50 MCG/ACT Nasal Suspension 2 sprays, Daily    GLUCOSAMINE-CHONDROITIN OR 1 tablet, 3 times daily    metoprolol succinate ER (TOPROL XL) 25 mg, Oral, Daily Beta Blocker    Multiple Vitamins-Minerals (MULTI-VITAMIN/MINERALS) Oral Tab 1 tablet, Daily     Scheduled Meds:   allopurinol  50 mg Oral Daily    apixaban  5 mg Oral BID    atorvastatin  40 mg Oral Daily    dilTIAZem HCl ER Coated Beads  360 mg Oral Daily    fluticasone propionate  2 spray Each Nare Daily    metoprolol succinate ER  25 mg Oral Daily Beta Blocker    multivitamin  1 tablet Oral Daily    cetirizine  5 mg Oral Daily     Continuous Infusions:  PRN Meds:  acetaminophen    meclizine    ondansetron    metoclopramide    OBJECTIVE   VITAL SIGNS:   Temp:  [97.5 °F (36.4 °C)-98.7 °F (37.1 °C)] 98.1 °F (36.7 °C)  Pulse:   [57-78] 69  Resp:  [15-26] 26  BP: (125-164)/(59-90) 125/74  SpO2:  [90 %-97 %] 91 %    PHYSICAL EXAM:    NEUROLOGIC:    Mental Status:  A&O x 3, Follows simple commands, no obvious aphasia or dysarthria  Cranial nerves: PERRL.  Visual fields full.  EOMI.  Face symmetric with normal movement bilaterally.  Hearing grossly intact. Tongue midline with normal movements.   Motor: Drift:  Absent; Motor exam is 5 out of 5 in all extremities bilaterally  Sensation: Intact to light touch bilaterally  Cerebellar: Normal Finger-To-Nose test      LABORATORY DATA:  Last 24 hour labs were reviewed in detail.  Recent Labs   Lab 11/26/24  1314 11/27/24  0546    138   K 4.0 4.2    105   CO2 28.0 27.0   * 97   BUN 27* 19   CREATSERUM 1.01 0.90     Recent Labs   Lab 11/26/24  1314 11/27/24  0546   WBC 7.7 5.9   HGB 12.5* 12.4*   .0 166.0     Recent Labs   Lab 11/26/24  1314   ALT 25   AST 22     No results for input(s): \"MG\", \"PHOS\" in the last 168 hours.  Last A1c value was 5.1% done 11/27/2024.     Lab Results   Component Value Date    LDL 44 11/27/2024    HDL 58 11/27/2024    TRIG 52 11/27/2024    VLDL 7 11/27/2024        Radiology:    CTA BRAIN + CTA CAROTIDS (CPT=70496/27274)    Result Date: 11/26/2024  CONCLUSION:  1. No acute intracranial hemorrhage or hydrocephalus. 2. Minimal age indeterminate small vessel ischemic disease. If there is clinical concern for acute ischemia/infarction, an MRI of the brain would be recommended for further evaluation. 3. There is a severe narrowing at the distal V4 segment of the right vertebral artery.  A component of this finding could also be developmental.  This is the non-dominant vertebral artery. 4. Ectatic mid ascending thoracic aorta measuring 3.9 cm. 5. Chronic left sphenoid sinusitis.  Postoperative changes in the paranasal sinuses.   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  Further management as per hospitalist.  Consider meclizine, Epley's exercises.  Previous history of stroke and atrial fibrillation.  Patient currently on Eliquis 5 mg twice daily and atorvastatin 40 mg daily.  No evidence to suggest that the current episode was due to TIA or stroke.  Bilateral lower extremity/feet paresthesias, most likely due to polyneuropathy.  Advise gabapentin 100 mg 3 times a day.  Further workup on outpatient basis.  Patient counseled.  All questions answered.  No further neuro recommendations at this time.  Please nudity to call for further queries.    Principal Problem:    Vertigo  Active Problems:    Benign essential HTN    Atrial fibrillation (HCC)    Azotemia    Hyperglycemia    Stenosis of right vertebral artery    Dyslipidemia       Fer Galdamez MD  Neurohospitalist  [facility]    Disclaimer: This record was dictated using Dragon software. There may be errors due to voice recognition problems that were not realized and corrected during the completion of the note.           [1]   Allergies  Allergen Reactions    Codeine HIVES    Eggplant HIVES    Hydrocodone HYPOTENSION    Oxycodone HIVES    Peanuts Tightness in Throat     Patient not sure if he has this allergy    Penicillins ANAPHYLAXIS     Per patient no organ involvement or skin blistering    Sulfa Antibiotics UNKNOWN     As a kid

## 2024-11-27 NOTE — CM/SW NOTE
Anticipated therapy need: Home with Home Healthcare, referrals sent in [place] and awaiting response.     MATTHIAS Pedersen

## 2024-11-27 NOTE — CONSULTS
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  While he was getting a CT scan for neck pain he laid down on the imaging table he had severe vertigo with the room spinning.  Shortly thereafter the symptoms significantly improved.  He reports no other symptoms of numbness, weakness, incoordination, nausea, vomiting, visual changes, or imbalance.  CTA head and neck demonstrates multifocal intracranial and extracranial atherosclerotic disease, as well as hypoplasia versus occlusion of the right V4 segment with a dominant left vertebral artery.    PAST MEDICAL HISTORY:  Past Medical History:    Anesthesia complication    anxiety from  OR experience as a child    Back pain    Back problem    CAD (coronary artery disease)    Calculus of kidney    Essential hypertension    High blood pressure    High cholesterol    History of blood transfusion    many years ago    Hyperkalemia    Sleep apnea    no therapy    Visual impairment    readers       PAST SURGICAL HISTORY:  Past Surgical History:   Procedure Laterality Date    Arthroscopy of joint unlisted Bilateral     Colonoscopy      Egd  2019        Tonsillectomy      Total knee replacement Bilateral     right, left       FAMILY HISTORY:  family history includes Cancer in his mother.    SOCIAL HISTORY:   reports that he quit smoking about 25 years ago. His smoking use included cigarettes. He started smoking about 55 years ago. He has a  30 pack-year smoking history. He has never been exposed to tobacco smoke. He has never used smokeless tobacco. He reports current alcohol use of about 2.0 - 3.0 standard drinks of alcohol per week. He reports that he does not currently use drugs.    ALLERGIES:  Allergies[1]    MEDICATIONS:  Prescriptions Prior to Admission[2]  Current Facility-Administered Medications   Medication Dose Route Frequency    acetaminophen (Tylenol Extra Strength) tab 1,000 mg  1,000 mg Oral Q8H PRN    allopurinol (Zyloprim) tab 50 mg  50 mg Oral Daily    apixaban (Eliquis) tab 5 mg  5 mg Oral BID    atorvastatin (Lipitor) tab 40 mg  40 mg Oral Daily    dilTIAZem ER (CardIZEM CD) 24 hr cap 360 mg  360 mg Oral Daily    fluticasone propionate (Flonase) 50 MCG/ACT nasal suspension 2 spray  2 spray Each Nare Daily    metoprolol succinate ER (Toprol XL) 24 hr tab 25 mg  25 mg Oral Daily Beta Blocker    multivitamin (Tab-A-Loren/Beta Carotene) tab 1 tablet  1 tablet Oral Daily    meclizine (Antivert) tab 25 mg  25 mg Oral TID PRN    ondansetron (Zofran) 4 MG/2ML injection 4 mg  4 mg Intravenous Q6H PRN    metoclopramide (Reglan) 5 mg/mL injection 5 mg  5 mg Intravenous Q8H PRN    cetirizine (ZyrTEC) tab 5 mg  5 mg Oral Daily       REVIEW OF SYSTEMS:  A 10-point system was reviewed.  Pertinent positives and negatives are noted in HPI.      PHYSICAL EXAMINATION:  VITAL SIGNS: /74 (BP Location: Left arm)   Pulse 69   Temp 98.1 °F (36.7 °C) (Oral)   Resp 26   Ht 65\"   Wt 226 lb (102.5 kg)   SpO2 91%   BMI 37.61 kg/m²   GENERAL:  Patient is a 84 year old male in no acute distress.  HEENT:  Normocephalic, atraumatic  LUNGS: Nonlabored breathing  HEART:  Well perfused  NEUROLOGICAL:    A&Ox 3, Speech fluent. Comprehension intact  PERRL, EOMI, FS, TM   Full strength x 4, no drift  Sensation intact    DIAGNOSTIC DATA:   Lab Results   Component Value Date    WBC 5.9 11/27/2024    HGB 12.4 11/27/2024    HCT 36.3 11/27/2024    .0  11/27/2024    CREATSERUM 0.90 11/27/2024    BUN 19 11/27/2024     11/27/2024    K 4.2 11/27/2024     11/27/2024    CO2 27.0 11/27/2024    GLU 97 11/27/2024    CA 9.0 11/27/2024    ALB 3.9 11/26/2024    ALKPHO 57 11/26/2024    BILT 0.8 11/26/2024    TP 6.1 11/26/2024    AST 22 11/26/2024    ALT 25 11/26/2024       ASSESSMENT:  84-year-old male who presented with vertigo and has an incidental right V4 segment occlusion versus hypoplasia    I discussed with the patient the current clinical situation and plan of care.  I explained that this is either a chronic atherosclerotic occlusion versus a normal anatomic variant.  I discussed the signs and symptoms for which to seek emergent medical attention.  I explained that neurology will optimize his medical management.  After discussion and answering his questions, he expressed understanding agreement with the plan.    Plan:  No acute neurosurgical intervention indicated  Medical management per neurology  No neurosurgical follow-up necessary  We will sign off at this time.  Please call with any questions or concerns    Dante Tran MD  Neurological Surgery  [facility]       [1]   Allergies  Allergen Reactions    Codeine HIVES    Eggplant HIVES    Hydrocodone HYPOTENSION    Oxycodone HIVES    Peanuts Tightness in Throat     Patient not sure if he has this allergy    Penicillins ANAPHYLAXIS     Per patient no organ involvement or skin blistering    Sulfa Antibiotics UNKNOWN     As a kid   [2]   Medications Prior to Admission   Medication Sig Dispense Refill Last Dose/Taking    GLUCOSAMINE-CHONDROITIN OR Take 1 tablet by mouth in the morning, at noon, and at bedtime. Pt takes 1,500mg spaced out daily   11/26/2024 Morning    allopurinol 100 MG Oral Tab Take 0.5 tablets (50 mg total) by mouth daily.   11/25/2024 Evening    apixaban 5 MG Oral Tab Take 1 tablet (5 mg total) by mouth 2 (two) times daily.   11/26/2024  Morning    dilTIAZem HCl ER Coated Beads 360 MG Oral Capsule SR 24 Hr Take 1 capsule (360 mg total) by mouth daily.   11/26/2024 Morning    Multiple Vitamins-Minerals (MULTI-VITAMIN/MINERALS) Oral Tab Take 1 tablet by mouth daily.   11/26/2024 Morning    fluticasone propionate 50 MCG/ACT Nasal Suspension 2 sprays by Each Nare route daily.   Past Month    atorvastatin 40 MG Oral Tab Take 1 tablet (40 mg total) by mouth daily.   11/26/2024 Morning    cetirizine 10 MG Oral Tab Take 1 tablet (10 mg total) by mouth daily.   11/26/2024 Morning    acetaminophen 500 MG Oral Tab Take 1 tablet (500 mg total) by mouth in the morning, at noon, and at bedtime.   11/26/2024 Morning    metoprolol succinate ER 25 MG Oral Tablet 24 Hr Take 1 tablet (25 mg total) by mouth Daily Beta Blocker. 30 tablet 2 Unknown

## 2024-11-27 NOTE — DISCHARGE SUMMARY
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 Pt was getting CT for arthritis when he got very dizzy. HE aishwarya any focal weakness, numbness or tingling. He has hx of Afib and is on eliquis. He denies any focal weakness, numbness or tingling. No CP or SOB. He currently denies any dizziness at this time. He does have a spinal stimulator in place so is unable to get MRI.   Please refer to history and physical done by Dr. Plata for details on admission    Brief Synopsis:   Workup done included  -orthostatics negative    -CTA brain and carotids done on 2024 showed  CONCLUSION:    1. No acute intracranial hemorrhage or hydrocephalus.   2. Minimal age indeterminate small vessel ischemic disease. If there is clinical concern for acute ischemia/infarction, an MRI of the brain would be recommended for further evaluation.   3. There is a severe narrowing at the distal V4 segment of the right vertebral artery.  A component of this finding could also be developmental.  This is the non-dominant vertebral artery.   4. Ectatic mid ascending thoracic aorta measuring 3.9 cm.    5. Chronic left sphenoid sinusitis.  Postoperative changes in the paranasal sinuses.        -Chest x-ray done on 11/26/2024 showed clear lungs without acute cardiopulmonary disease.  -Seen by neurology who advised that patient's dizziness positional most likely due to vestibular system disorder.  Consider meclizine and Epley's exercises per neurology.  Treated with meclizine as needed.  Seen by PT OT.  Bilateral lower extremity feet paresthesias possibly due to polyneuropathy by neurology who started the patient on gabapentin 100 mg 3 times daily.  -Seen by neurosurgery who advised that patient has incidental right V4 segment occlusion versus hypoplasia   -Patient improved clinically and eager to go home today.  Cleared for discharge by neurology and neurosurgeon today, follow-up with them as outpatient as directed.  Follow-up with regular outpatient primary care physician  within 1 week in office      TCM Diagnosis at discharge from Hospital: Other: See above; still recommend for TCM follow-up    Lace+ Score: 78  59-90 High Risk  29-58 Medium Risk  0-28   Low Risk.     TCM Follow-Up Recommendation:Omid Knox   LACE > 58: High Risk of readmission after discharge from the hospital.      PCP: PHYSICIAN NONSTAFF    Consultations:   Consultants         Provider   Role Specialty     Dante Tran MD      Consulting Physician NEUROSURGERY     Fer Galdamez MD      Consulting Physician NEUROLOGY       Procedures during hospitalization:   None    Incidental none or significant findings and recommendations (brief descriptions):  None    Lab/Test results pending at Discharge:   None      Discharge Medications:        Discharge Medications        CONTINUE taking these medications        Instructions Prescription details   acetaminophen 500 MG Tabs  Commonly known as: Tylenol Extra Strength      Take 1 tablet (500 mg total) by mouth in the morning, at noon, and at bedtime.   Refills: 0     allopurinol  100 MG Tabs  Commonly known as: Zyloprim      Take 0.5 tablets (50 mg total) by mouth daily.   Refills: 0     apixaban 5 MG Tabs  Commonly known as: Eliquis      Take 1 tablet (5 mg total) by mouth 2 (two) times daily.   Refills: 0     atorvastatin 40 MG Tabs  Commonly known as: Lipitor      Take 1 tablet (40 mg total) by mouth daily.   Refills: 0     cetirizine 10 MG Tabs  Commonly known as: ZyrTEC      Take 1 tablet (10 mg total) by mouth daily.   Refills: 0     dilTIAZem HCl ER Coated Beads 360 MG Cp24  Commonly known as: CARDIZEM CD      Take 1 capsule (360 mg total) by mouth daily.   Refills: 0     fluticasone propionate 50 MCG/ACT Susp  Commonly known as: Flonase      2 sprays by Each Nare route daily.   Refills: 0     GLUCOSAMINE-CHONDROITIN OR      Take 1 tablet by mouth in the morning, at noon, and at bedtime. Pt takes 1,500mg spaced out daily   Refills: 0     metoprolol succinate ER 25 MG Tb24  Commonly known as: Toprol XL      Take 1 tablet (25 mg total) by mouth Daily Beta Blocker.   Quantity: 30 tablet  Refills: 2     Multi-Vitamin/Minerals Tabs      Take 1 tablet by mouth daily.   Refills: 0               [place] prescription monitoring program data reviewed in epic before prescribing narcotics/controlled substances: Not applicable as no narcotics prescribed    Follow up Visits: Follow-up with PHYSICIAN NONSTAFF in 1 week    Audie Villalobos MD  [address] [facility]  [address]  [facility] [id number] [phone]    Schedule an appointment as soon as possible for a visit in 1 week(s)      Fer Galdamez MD  [address]  [facility] [address]  [facility] [address] [phone]    Follow up  As needed    Appointments for Next 30 Days 11/27/2024 - 12/27/2024      None              Physical Exam:  /74 (BP Location: Left arm)   Pulse 69   Temp 98.1 °F (36.7 °C) (Oral)   Resp 26   Ht 5' 5\" (1.651 m)   Wt 226 lb (102.5 kg)   SpO2 91%   BMI 37.61 kg/m²       General appearance: alert and cooperative  Lungs: clear to  auscultation bilaterally  Heart: S1, S2 normal, no murmur,  regular rate and rhythm  Abdomen: soft, non-tender; bowel sounds normal  Extremities: extremities normal,no cyanosis or edema  Pulses: 2+ and symmetric  Neurologic: Awake,alert,nonfocal  Psych: Mood and affect appears normal      Discharge Condition: stable    Patient Discharge Instructions: See electronic chart      More than 30 minutes on discharge    Laura Gibbs MD  11/27/2024

## 2024-11-27 NOTE — PLAN OF CARE
Assumed care at 0730  Patient alert, oriented x 4  VSS, RA throughout shift, NSR w/ HB on tele  PRNs for foot pain  Up x 1 and FWW  Voiding in bathroom  (-) BM this shift  Tolerating diet well  Call light within reach     All services cleared for discharge  AVS reviewed with patient and wife, all questions answered  Belongings sent with patient    NURSING DISCHARGE NOTE    Discharged Home via Wheelchair.  Accompanied by Spouse and Support staff  Belongings Taken by patient/family.

## 2024-11-27 NOTE — DISCHARGE INSTRUCTIONS
Sometimes managing your health at home requires assistance.  The [facility] team has recognized your preference to use [facility].  They can be reached by phone at [phone].  The fax number for your reference is [phone].  A representative from the home health agency will contact you or your family to schedule your first visit.

## 2024-11-27 NOTE — PROGRESS NOTES
11/27/24 1450 11/27/24 1455 11/27/24 1500   Vital Signs   /66 138/84 133/83   MAP (mmHg) 86 100 99   BP Location Left arm Left arm Left arm   BP Method Automatic Automatic Automatic   Patient Position Lying Sitting Standing

## 2024-11-27 NOTE — PLAN OF CARE
Assumed care at approximately 1930  A & O x 4  RA  NSR w 1st degree HB on tele  C/o dizziness, PRN meclizine given  Denies pain  Neurology and PT/OT to see  Call light within reach  Patient updated on plan of care

## 2024-11-27 NOTE — PROGRESS NOTES
11/26/24 1836   BiPAP   $ RT Standby Charge (per 15 min) 1   $ BRAEDEN Follow up charge Yes   BiPAP/CPAP Monitored Parameters   Toleration Refused

## 2025-03-19 ENCOUNTER — ORDER TRANSCRIPTION (OUTPATIENT)
Dept: ADMINISTRATIVE | Facility: HOSPITAL | Age: 85
End: 2025-03-19

## 2025-03-19 DIAGNOSIS — M54.16 LUMBAR RADICULOPATHY: Primary | ICD-10-CM

## 2025-03-24 RX ORDER — LOSARTAN POTASSIUM 25 MG/1
25 TABLET ORAL DAILY
COMMUNITY
Start: 2024-12-23

## 2025-03-25 NOTE — IMAGING NOTE
Spoke with patient regarding myelogram scheduled for Monday 3/31. Per patient, he is unable to stop Eliquis due to recent stroke at beginning of March. Dr. Valle, radiologist, aware. Patient made aware we are unable to safely perform myelogram on anticoagulant. Patient instructed to follow up with ordering provider. Patient verbalizes understanding and agreeable with plan of care. No further questions at this time.

## 2025-03-31 ENCOUNTER — HOSPITAL ENCOUNTER (OUTPATIENT)
Dept: GENERAL RADIOLOGY | Facility: HOSPITAL | Age: 85
Discharge: HOME | End: 2025-03-31
Attending: ORTHOPAEDIC SURGERY
Payer: MEDICARE

## 2025-03-31 ENCOUNTER — APPOINTMENT (OUTPATIENT)
Dept: CT IMAGING | Facility: HOSPITAL | Age: 85
End: 2025-03-31
Attending: ORTHOPAEDIC SURGERY
Payer: MEDICARE

## 2025-07-29 NOTE — PHYSICAL THERAPY NOTE
RT PROGRESS NOTE    VENT DAY # 1    CURRENT SETTINGS:  FiO2 (%): 60 %, Resp: 18, Vent Mode: VC/AC, Resp Rate (Set): 18 breaths/min, Tidal Volume (Set, mL): 500 mL, PEEP (cm H2O): 5 cmH2O, Resp Rate (Set): 18 breaths/min, Tidal Volume (Set, mL): 500 mL, PEEP (cm H2O): 5 cmH2O      ETT SIZE 7.5 Secured at 23 cm at teeth/gums      NOTE / SHIFT SUMMARY:       Pt has been received from OR around 12:20 pm and placed on vent as ordered with above mentioned settings. Pt appears to be stale on vent. Weaning trial will be initiated when appropriate.     Cassius Houser, RRT   PHYSICAL THERAPY TREATMENT NOTE - INPATIENT     Room Number: 868/245-Q       Presenting Problem: LBP s/p Ant/post spinal fusion w/instrumentation L2-S1    Problem List  Principal Problem:    Kyphoscoliosis  Active Problems:    Spinal stenosis of lumbar reg mechanics    BALANCE                                                                                                                     Static Sitting: Good  Dynamic Sitting: Fair +           Static Standing: Fair  Dynamic [address] A with log roll N/T today   Goal #2 Patient is able to demonstrate transfers Sit to/from Stand at assistance level: modified independent with walker - rolling      Goal #2  Current Status  CGA to min  A with RW   Goal #3 Patient is able to ambulate 200 fee

## (undated) DEVICE — GLOVE SUR 6.5 SENSICARE PI PIP GRN PWD F

## (undated) DEVICE — KIT HEMSTAT MTRX 8ML PORCINE GEL HUM THROM

## (undated) DEVICE — C-ARMOR C-ARM EQUIPMENT COVERS CLEAR STERILE UNIVERSAL FIT 12 PER CASE: Brand: C-ARMOR

## (undated) DEVICE — NON-ADHERENT DRESSING: Brand: TELFA

## (undated) DEVICE — GLOVE SUR 6.5 SENSICARE PI MIC PIP CRM PWD F

## (undated) DEVICE — E-Z CLEAN, NON-STICK, PTFE COATED, ELECTROSURGICAL BLADE ELECTRODE, 2.75 INCH (7 CM): Brand: MEGADYNE

## (undated) DEVICE — GOWN,SIRUS,FAB REINF,RAGLAN,L,STERILE: Brand: MEDLINE

## (undated) DEVICE — SUT MCRYL 3-0 18IN PS-2 ABSRB UD 19MM 3/8 CIR

## (undated) DEVICE — SPONGE LAP 18X18IN WHT COT 4 PLY FLD STRUNG

## (undated) DEVICE — 3M™ TEGADERM™ TRANSPARENT FILM DRESSING FRAME STYLE, 1626W, 4 IN X 4-3/4 IN (10 CM X 12 CM), 50/CT 4CT/CASE: Brand: 3M™ TEGADERM™

## (undated) DEVICE — ADHESIVE SKIN TOP FOR WND CLSR DERMBND ADV

## (undated) DEVICE — 3M™ STERI-DRAPE™ U-DRAPE 1015: Brand: STERI-DRAPE™

## (undated) DEVICE — SUT COAT VCRL+ 0 27IN UR-6 ABSRB VLT ANTIBACT

## (undated) DEVICE — OUTPATIENT: Brand: MEDLINE INDUSTRIES, INC.

## (undated) DEVICE — SHEET, T, LAPAROTOMY, STERILE: Brand: MEDLINE

## (undated) DEVICE — APPLICATOR PREP 26ML CHG 2% ISO ALC 70%

## (undated) DEVICE — GLOVE SUR 8.5 SENSICARE PI MIC PIP CRM PWD F

## (undated) DEVICE — C-ARM: Brand: UNBRANDED

## (undated) DEVICE — SPONGE GZ 4X4IN COT 12 PLY TYP VII WVN C

## (undated) DEVICE — Device: Brand: JELCO

## (undated) DEVICE — YANKAUER,FLEXIBLE HANDLE,REGLR CAPACITY: Brand: MEDLINE INDUSTRIES, INC.

## (undated) DEVICE — GLOVE SUR 7 SENSICARE PI MIC PIP CRM PWD F

## (undated) DEVICE — 3M™ IOBAN™ 2 ANTIMICROBIAL INCISE DRAPE 6650EZ: Brand: IOBAN™ 2

## (undated) DEVICE — SUT COAT VCRL 2-0 27IN ABSRB UD 26MM CT-2

## (undated) DEVICE — FEE RENTAL MD SURG USE

## (undated) DEVICE — SPK10329 JACKSON KIT: Brand: SPK10329 JACKSON KIT

## (undated) DEVICE — GUIDE PIN 7426007 2MM BLUNT

## (undated) DEVICE — SLEEVE COMPR MD KNEE LEN SGL USE KENDALL SCD

## (undated) DEVICE — WRAP COOLING BACK W/NO PILLOW

## (undated) DEVICE — SHEET,DRAPE,53X77,STERILE: Brand: MEDLINE

## (undated) DEVICE — KIT NDL TRCR/BVL TIP FOR CANN PEDCL SCR SPINE

## (undated) NOTE — LETTER
[facility]  [address] [address]  [phone]  Authorization for Imaging Procedure  Date of Procedure: 1/17/2024    I hereby authorize Dr. Ling, my physician and his/her assistants (if applicable), which may include medical students, residents, and/or fellows, to perform the following procedure and administer such anesthesia as may be determined necessary by my physician: XR MYELOGRAM LUMBAR WITH INJECTION  (SOH=[id number]) on Bob Knox.   2.  I recognize that during the procedure, unforeseen conditions may necessitate additional or different procedures than those listed above. I, therefore, further authorize and request that the above-named physician, assistants, or designees perform such procedures as are, in their judgment, necessary and desirable.    3.  My physician has discussed prior to my procedure the potential benefits, risks and side effects of this procedure; the likelihood of achieving goals; and potential problems that might occur during recuperation. They also discussed reasonable alternatives to the procedure, including risks, benefits, and side effects related to the alternatives and risks related to not receiving this procedure. I have had all my questions answered and I acknowledge that no guarantee has been made as to the result that may be obtained.    4.  Should the need arise during my procedure, which includes change of level of care prior to discharge, I also consent to the administration of blood and/or blood products. Further, I understand that despite careful testing and screening of blood or blood products by collecting agencies, I may still be subject to ill effects as a result of receiving a blood transfusion and/or blood products. The following are some, but not all, of the potential risks that can occur: fever and allergic reactions, hemolytic reactions, transmission of diseases such as Hepatitis, AIDS and  Cytomegalovirus (CMV) and fluid overload. In the event that I wish to have an autologous transfusion of my own blood, or a directed donor transfusion, I will discuss this with my physician.  Check only if Refusing Blood or Blood Products  I understand refusal of blood or blood products as deemed necessary by my physician may have serious consequences to my condition to include possible death. I hereby assume responsibility for my refusal and release the hospital, its personnel, and my physicians from any responsibility for the consequences of my refusal.   [  ] Patient Refuses Blood      5.  I authorize the use of any specimen, organs, tissues, body parts or foreign objects that may be removed from my body during the procedure for diagnosis, research or teaching purposes and their subsequent disposal by hospital authorities. I also authorize the release of specimen test results and/or written reports to my treating physician on the hospital medical staff or other referring or consulting physicians involved in my care, at the discretion of the Pathologist or my treating physician.    6.  I consent to the photographing or videotaping of the procedures to be performed, including appropriate portions of my body for medical, scientific, or educational purposes, provided my identity is not revealed by the pictures or by descriptive texts accompanying them. If the procedure has been photographed/videotaped, the physician will obtain the original picture, image, videotape or CD. The hospital will not be responsible for storage, release or maintenance of the picture, image, tape or CD.   7.  I consent to the presence of a  or observers in the operating room as deemed necessary by my physician or their designees.    8.  I recognize that in the event my procedure results in extended X-Ray/fluoroscopy time, I may develop a skin reaction.    9.  If I have a Do Not Attempt Resuscitation (DNAR) order in place,  that status will be suspended while in the operating room, procedural suite, and during the recovery period unless otherwise explicitly stated by me (or a person authorized to consent on my behalf). The performing physician or my attending physician will determine when the applicable recovery period ends for purposes of reinstating the DNAR order.  10.  I acknowledge that my physician has explained sedation/analgesia administration to me including the risk and benefits I consent to the administration of sedation/analgesia as may be necessary or desirable in the judgment of my physician.      I CERTIFY THAT I HAVE READ AND FULLY UNDERSTAND THE ABOVE CONSENT FOR THE PROCEDURE.   Signature of Patient: _____________________________________________________________  Responsible person in case of minor, unconscious: ____________________________________  Relationship to patient:  __________________________________________________________  Signature of Witness: _______________________________Date: _________Time: __________    Statement of Physician: My signature below affirms that prior to the time of the procedure, I have explained to the patient and/or his guardian, the risks and benefits involved in the proposed treatment and any reasonable alternative to the proposed treatment. I have also explained the risks and benefits involved in the refusal of the proposed treatment and have answered the patient's questions. If I have a significant financial interest in a co-management agreement or a significant financial interest in any product or implant, or other significant relationship used in the procedure/surgery, I have disclosed this and had a discussion with my patient.  Signature of Physician:   _________________________________Date:_____________Time:________    Patient Name: Bob Knox : 1940  Printed: 2024   Medical Record #: [id number]

## (undated) NOTE — LETTER
[facility]  Administration of Anesthesia  1. Penelope Azar, or _________________________________ acting on his behalf, (Patient) (Dependent/Representative) request to receive anesthesia for my pending procedure/operation/treatment.   DEVIKA p infections, high spinal block, spinal bleeding, seizure, cardiac arrest and death. 7. AWARENESS: I understand that it is possible (but unlikely) to have explicit memory of events from the operating room while under general anesthesia.   8. ELECTROCONVULSIV unconscious pt /Relationship    My signature below affirms that prior to the time of the procedure, I have explained to the patient and/or his/her guardian, the risks and benefits of undergoing anesthesia, as well as any reasonable alternatives.     _______

## (undated) NOTE — LETTER
[address] [place] [address] Rd, [place], [place]     AUTHORIZATION FOR SURGICAL OPERATION OR PROCEDURE    I hereby authorize Dr. Benjamin Martinez MD, Dr. Abdiel Rowe, my Physician(s) and whomever may be designated as the doctor's Assistant, to Pb own blood, or a directed donor transfusion, I will discuss this with my Physician. 4. I consent to the photographing of procedure(s) to be performed for the purposes of advancing medicine, science and/or education, provided my identity is not revealed.  If _______________________________________________________________ ____________________________  (Witness signature)                                                                                                  (Date)                                (Time)

## (undated) NOTE — IP AVS SNAPSHOT
Patient Demographics     Address  [address] [place] [address] Phone  [phone] (Home) *Preferred*  [phone] (Work)  [phone] [facility]) E-mail Address  [email or web address]      Emergency Contact(s)     Name Relation Home Work Celeste atorvastatin 40 MG Tabs  Commonly known as:  LIPITOR  Next dose due: Tomorrow morning      Take 40 mg by mouth daily. CENTRUM SILVER Tabs  Next dose due: Tomorrow morning      Take 1 tablet by mouth daily.           cetirizine 10 MG Tabs  Commonl Commonly known as:  ULTRAM      Take 2 tablets (100 mg total) by mouth every 6 (six) hours. JANNETTE [place]               Where to Get Your Medications      These medications were sent to [address] [place] [address] - [address] [facility],[address] 301 mL 05/31/19 1003 Given      [id number] traMADol HCl (ULTRAM) tab 100 mg 05/30/19 1443 Given      [id number] traMADol HCl (ULTRAM) tab 100 mg 05/30/19 2139 Given      [id number] traMADol HCl (ULTRAM) tab 100 mg 05/31/19 0324 Given      [id number] traMADol HCl ( History of alcohol use      Bob Knox[GL. 2] is a[GL.3 66year old[GL. 2] male.     Allergies:[GL.1]   Codeine                 HIVES  Eggplant                HIVES  Oxycodone               HIVES  Penicillins             ANAPHYLAXIS  Sulfa Antibiotics [facility]  Report of Consultation    Brenna Brood Patient Status:  Inpatient    1940 MRN [id number]   Location [facility] 2W/SW Attending Sea Pearce MD   [facility] Day # 2 PCP No primary care p Performed by Terry Oscar MD at [address] [place] [address]    • ARTHROSCOPY OF JOINT UNLISTED Bilateral    • FAR LATERAL LUMBAR INTERBODY FUSION 2 LEVEL N/A 5/23/2019    Performed by Terry Oscar MD at [address] [place] [address]    • KNEE REPLACEMENT SURGERY Left 2016   • KNEE REP •  PEG 3350 (MIRALAX) powder packet 17 g, 17 g, Oral, BID  •  Senna (SENOKOT) tab 8.6 mg, 8.6 mg, Oral, Daily  •  bisacodyl (DULCOLAX) rectal suppository 10 mg, 10 mg, Rectal, Daily PRN  •  Cyclobenzaprine HCl (FLEXERIL) tab 5 mg, 5 mg, Oral, TID PRN  •  d Cardiac: Regular rate and rhythm, S1, S2 normal, no murmur, rub or gallop. Lungs: Clear without wheezes, rales, rhonchi or dullness. Normal excursions and effort. Abdomen: Soft, non-tender. Extremities: Without clubbing, cyanosis or edema.   Peripheral good. Will try later to see him. [DK.1]     Attribution Driver    DK. 1 - Dwight Avendano PTA on 5/31/2019  9:56 AM               Physical Therapy Note signed by Dwight Avendano PTA at 5/31/2019  9:44 AM  Version 1 of 1    Author:  Dwight Avendano PTA Ser safety, he is anxious and impulsive at times. Patient educated on spinal precaution and necessary of wearing a brace any time he is ouit of bed. Patient able to recall his precaution. Patient instructed on both legs exer in sitting.  Patient easily inc SOB How much help from another person does the patient currently need. .. [DK.1]   -   Moving to and from a bed to a chair (including a wheelchair)?: A Little   -   Need to walk in hospital room?: A Little   -   Climbing 3-5 steps with a railing?: A Lot[DK. 2] DK.2 Jem Smith, MICHAEL on 5/30/2019 11:56 AM               Physical Therapy Note signed by Donelle Leyden, PTA at 5/29/2019 11:35 AM  Version 1 of 1    Author:  Donelle Leyden, PTA Service:  Physical Medicine and Rehabilitation Author Type:  Phys based on documentation in the [facility] '6 clicks' Inpatient Basic Mobility Short Form. Research supports that patients with this level of impairment may benefit from subacute.     DISCHARGE RECOMMENDATIONS  PT Discharge Recommendations: Sub-acute rehabilitati Gait Assistance: Minimum assistance  Distance (ft): 50 and 20 x 2  Assistive Device: Rolling walker  Pattern: Shuffle;R Flexed knee;L Flexed knee;R Foot flat;L Foot flat(impulsive )  Stoop/Curb Assistance: Not tested  Comment : short sit rest breaks betwee PHYSICAL THERAPY TREATMENT NOTE - INPATIENT     Room Number: 845/311-S       Presenting Problem: LBP s/p Ant/post spinal fusion w/instrumentation L2-S1    Problem List  Principal Problem:    Kyphoscoliosis  Active Problems:    Spinal stenosis of lumbar reg PT Discharge Recommendations: Sub-acute rehabilitation;24 hour care/supervision;Home with home health PT(depends on progress)     PLAN  PT Treatment Plan: Balance training;Transfer training;Gait training;Stair training    SUBJECTIVE  I want to wak    OBJEC THERAPEUTIC EXERCISES  Lower Extremity Alternating marching  Ankle pumps  Glut sets  Heel slides  Knee extension  Quad sets     Position Sitting and Supine       Patient End of Session: Up in chair; All patient questions and concerns addressed;RN aware of s Occupational Therapy Note signed by Ulysses Molt at 5/30/2019  9:23 AM  Version 1 of 1    Author:  Ulysses Molt Service:  — Author Type:  Occupational Therapy Assistant    Filed:  5/30/2019  9:23 AM Date of Service:  5/30/2019  9:16 AM Status:  Tamera Dickerson Pt remained up in chair at end of session with all needs within reach . Ida Sharma     DISCHARGE RECOMMENDATIONS  OT Discharge Recommendations: 24 hour care/supervision;Sub-acute rehabilitation  OT Device Recommendations: Reacher;Sock aid;Long-handled shoehorn;Grab Bathing: NT  Toileting: NT  Upper Body Dressing: min assist for brace  Lower Body Dressing: mod assist with instruction in use of [address] AE    Education Provided: role of OT, LE dressing with AE PRN, standing endurance, safety with transfers and ambulation  Pa brace, and reviewed spine precautions with cues. Increased time for task, 2/ pt c/o abdominal pain, nursing aware.  Sit to stand with min assist x2, 2/ pt fear of falling, and pt ambulated 60 ft with RW for support , cues for safety , cues for RW placement Precautions: Lumbar brace;Spine    WEIGHT BEARING RESTRICTION  Weight Bearing Restriction: None                PAIN ASSESSMENT  Ratin  Location: (lower back)  Management Techniques:  Activity promotion     ACTIVITY TOLERANCE                         O2 S assist x2 2/ pt appearing 'shaky', nursing aware.  Pt appearing anxious    Patient will complete toileting with CGA and AD  Comment: mod assist     Patient will tolerate standing for 5 minutes in prep for adls with SBA and AD   Comment:Pt maintained static use of ae demonstrated. Suggestion made for hip kit and shower chair for home use. Pt receptive to suggestions. Having wife present for therapy to train her in brace management and dressing strategies discussed.  Pt receptive and tentative plan made for Wed -   Putting on and taking off regular upper body clothing?: A Little  -   Taking care of personal grooming such as brushing teeth?: None  -   Eating meals?: None    AM-PAC Score:  Score: 17  Approx Degree of Impairment: 50.11%  Standardized Score (AM-PAC S

## (undated) NOTE — LETTER
Robyn Figueroa [address]  [facility] [address], [place], [place]  [address]  INFORMED CONSENT FOR TRANSFUSION OF BLOOD OR BLOOD PRODUCTS  My physician has informed me of the nature, purpose, benefits and risks of transfusion for blood and blood components that ______________________________________________  (Signature of Patient)                                                            (Responsible party in case of Minor,

## (undated) NOTE — LETTER
[address] [place] [address] Rd, [place], [place]     AUTHORIZATION FOR SURGICAL OPERATION OR PROCEDURE    I hereby authorize Dr. Skyla Spears MD, my Physician(s) and whomever may be designated as the doctor's Assistant, to perform th 4. I consent to the photographing of procedure(s) to be performed for the purposes of advancing medicine, science and/or education, provided my identity is not revealed.  If the procedure has been videotaped, the physician/surgeon will obtain the original v (Witness signature)                                                                                                  (Date)                                (Time)  STATEMENT OF PHYSICIAN My signature below affirms that prior to the time of the procedure;  I

## (undated) NOTE — IP AVS SNAPSHOT
[facility]            (For Outpatient Use Only) Initial Admit Date: 5/23/2019   Inpt/Obs Admit Date: Inpt: 5/23/19 / Obs: N/A   Discharge Date:    Link Pattee:  [de-identified]   MRN: [de-identified]   CSN: [id number]   CEID: [id number]        [facility] Subscriber ID:  Pt Rel to Subscriber:    Hospital Account Financial Class: Medicare    May 31, 2019